# Patient Record
Sex: MALE | Race: WHITE | NOT HISPANIC OR LATINO | Employment: OTHER | URBAN - METROPOLITAN AREA
[De-identification: names, ages, dates, MRNs, and addresses within clinical notes are randomized per-mention and may not be internally consistent; named-entity substitution may affect disease eponyms.]

---

## 2017-03-26 ENCOUNTER — HOSPITAL ENCOUNTER (EMERGENCY)
Facility: HOSPITAL | Age: 27
Discharge: HOME | End: 2017-03-27
Attending: EMERGENCY MEDICINE | Admitting: EMERGENCY MEDICINE

## 2017-03-26 ENCOUNTER — APPOINTMENT (OUTPATIENT)
Dept: CT IMAGING | Facility: HOSPITAL | Age: 27
End: 2017-03-26

## 2017-03-26 DIAGNOSIS — R11.2 NAUSEA AND VOMITING, INTRACTABILITY OF VOMITING NOT SPECIFIED, UNSPECIFIED VOMITING TYPE: ICD-10-CM

## 2017-03-26 DIAGNOSIS — N20.1 URETERAL CALCULUS: ICD-10-CM

## 2017-03-26 DIAGNOSIS — N23 URETERAL COLIC: Primary | ICD-10-CM

## 2017-03-26 LAB
ALBUMIN SERPL-MCNC: 4.8 G/DL (ref 3.2–4.8)
ALBUMIN/GLOB SERPL: 1.5 G/DL (ref 1.5–2.5)
ALP SERPL-CCNC: 115 U/L (ref 25–100)
ALT SERPL W P-5'-P-CCNC: 46 U/L (ref 7–40)
ANION GAP SERPL CALCULATED.3IONS-SCNC: 9 MMOL/L (ref 3–11)
AST SERPL-CCNC: 34 U/L (ref 0–33)
BACTERIA UR QL AUTO: ABNORMAL /HPF
BASOPHILS # BLD AUTO: 0.03 10*3/MM3 (ref 0–0.2)
BASOPHILS NFR BLD AUTO: 0.3 % (ref 0–1)
BILIRUB SERPL-MCNC: 0.9 MG/DL (ref 0.3–1.2)
BILIRUB UR QL STRIP: NEGATIVE
BUN BLD-MCNC: 16 MG/DL (ref 9–23)
BUN/CREAT SERPL: 16 (ref 7–25)
CALCIUM SPEC-SCNC: 10 MG/DL (ref 8.7–10.4)
CHLORIDE SERPL-SCNC: 100 MMOL/L (ref 99–109)
CLARITY UR: ABNORMAL
CO2 SERPL-SCNC: 27 MMOL/L (ref 20–31)
COLOR UR: ABNORMAL
CREAT BLD-MCNC: 1 MG/DL (ref 0.6–1.3)
DEPRECATED RDW RBC AUTO: 37.8 FL (ref 37–54)
EOSINOPHIL # BLD AUTO: 0.26 10*3/MM3 (ref 0.1–0.3)
EOSINOPHIL NFR BLD AUTO: 2.2 % (ref 0–3)
ERYTHROCYTE [DISTWIDTH] IN BLOOD BY AUTOMATED COUNT: 12.6 % (ref 11.3–14.5)
GFR SERPL CREATININE-BSD FRML MDRD: 92 ML/MIN/1.73
GLOBULIN UR ELPH-MCNC: 3.1 GM/DL
GLUCOSE BLD-MCNC: 92 MG/DL (ref 70–100)
GLUCOSE UR STRIP-MCNC: NEGATIVE MG/DL
HCT VFR BLD AUTO: 41.5 % (ref 38.9–50.9)
HGB BLD-MCNC: 14.3 G/DL (ref 13.1–17.5)
HGB UR QL STRIP.AUTO: ABNORMAL
HYALINE CASTS UR QL AUTO: ABNORMAL /LPF
IMM GRANULOCYTES # BLD: 0.07 10*3/MM3 (ref 0–0.03)
IMM GRANULOCYTES NFR BLD: 0.6 % (ref 0–0.6)
KETONES UR QL STRIP: ABNORMAL
LEUKOCYTE ESTERASE UR QL STRIP.AUTO: ABNORMAL
LIPASE SERPL-CCNC: 27 U/L (ref 6–51)
LYMPHOCYTES # BLD AUTO: 2.92 10*3/MM3 (ref 0.6–4.8)
LYMPHOCYTES NFR BLD AUTO: 24.5 % (ref 24–44)
MCH RBC QN AUTO: 28.7 PG (ref 27–31)
MCHC RBC AUTO-ENTMCNC: 34.5 G/DL (ref 32–36)
MCV RBC AUTO: 83.3 FL (ref 80–99)
MONOCYTES # BLD AUTO: 0.63 10*3/MM3 (ref 0–1)
MONOCYTES NFR BLD AUTO: 5.3 % (ref 0–12)
NEUTROPHILS # BLD AUTO: 8 10*3/MM3 (ref 1.5–8.3)
NEUTROPHILS NFR BLD AUTO: 67.1 % (ref 41–71)
NITRITE UR QL STRIP: NEGATIVE
PH UR STRIP.AUTO: 6 [PH] (ref 5–8)
PLATELET # BLD AUTO: 207 10*3/MM3 (ref 150–450)
PMV BLD AUTO: 10.1 FL (ref 6–12)
POTASSIUM BLD-SCNC: 3.4 MMOL/L (ref 3.5–5.5)
PROT SERPL-MCNC: 7.9 G/DL (ref 5.7–8.2)
PROT UR QL STRIP: ABNORMAL
RBC # BLD AUTO: 4.98 10*6/MM3 (ref 4.2–5.76)
RBC # UR: ABNORMAL /HPF
REF LAB TEST METHOD: ABNORMAL
SODIUM BLD-SCNC: 136 MMOL/L (ref 132–146)
SP GR UR STRIP: 1.03 (ref 1–1.03)
SQUAMOUS #/AREA URNS HPF: ABNORMAL /HPF
UROBILINOGEN UR QL STRIP: ABNORMAL
WBC NRBC COR # BLD: 11.91 10*3/MM3 (ref 3.5–10.8)
WBC UR QL AUTO: ABNORMAL /HPF

## 2017-03-26 PROCEDURE — 25010000002 HYDROMORPHONE PER 4 MG: Performed by: EMERGENCY MEDICINE

## 2017-03-26 PROCEDURE — 74176 CT ABD & PELVIS W/O CONTRAST: CPT

## 2017-03-26 PROCEDURE — 96375 TX/PRO/DX INJ NEW DRUG ADDON: CPT

## 2017-03-26 PROCEDURE — 87086 URINE CULTURE/COLONY COUNT: CPT | Performed by: EMERGENCY MEDICINE

## 2017-03-26 PROCEDURE — 99284 EMERGENCY DEPT VISIT MOD MDM: CPT

## 2017-03-26 PROCEDURE — 80053 COMPREHEN METABOLIC PANEL: CPT | Performed by: EMERGENCY MEDICINE

## 2017-03-26 PROCEDURE — 81001 URINALYSIS AUTO W/SCOPE: CPT | Performed by: EMERGENCY MEDICINE

## 2017-03-26 PROCEDURE — 83690 ASSAY OF LIPASE: CPT | Performed by: EMERGENCY MEDICINE

## 2017-03-26 PROCEDURE — 96376 TX/PRO/DX INJ SAME DRUG ADON: CPT

## 2017-03-26 PROCEDURE — 96374 THER/PROPH/DIAG INJ IV PUSH: CPT

## 2017-03-26 PROCEDURE — 25010000002 ONDANSETRON PER 1 MG

## 2017-03-26 PROCEDURE — 25010000002 HYDROMORPHONE PER 4 MG

## 2017-03-26 PROCEDURE — 25010000002 KETOROLAC TROMETHAMINE PER 15 MG

## 2017-03-26 PROCEDURE — 85025 COMPLETE CBC W/AUTO DIFF WBC: CPT | Performed by: EMERGENCY MEDICINE

## 2017-03-26 RX ORDER — KETOROLAC TROMETHAMINE 15 MG/ML
15 INJECTION, SOLUTION INTRAMUSCULAR; INTRAVENOUS ONCE
Status: COMPLETED | OUTPATIENT
Start: 2017-03-26 | End: 2017-03-26

## 2017-03-26 RX ORDER — ONDANSETRON 2 MG/ML
INJECTION INTRAMUSCULAR; INTRAVENOUS
Status: COMPLETED
Start: 2017-03-26 | End: 2017-03-26

## 2017-03-26 RX ORDER — HYDROMORPHONE HYDROCHLORIDE 1 MG/ML
0.5 INJECTION, SOLUTION INTRAMUSCULAR; INTRAVENOUS; SUBCUTANEOUS ONCE
Status: COMPLETED | OUTPATIENT
Start: 2017-03-26 | End: 2017-03-26

## 2017-03-26 RX ORDER — IBUPROFEN 600 MG/1
600 TABLET ORAL EVERY 6 HOURS PRN
Status: ON HOLD | COMMUNITY
End: 2017-04-12

## 2017-03-26 RX ORDER — OXYCODONE HYDROCHLORIDE AND ACETAMINOPHEN 5; 325 MG/1; MG/1
1 TABLET ORAL EVERY 6 HOURS PRN
COMMUNITY
End: 2017-03-27

## 2017-03-26 RX ORDER — ONDANSETRON 2 MG/ML
4 INJECTION INTRAMUSCULAR; INTRAVENOUS ONCE
Status: COMPLETED | OUTPATIENT
Start: 2017-03-26 | End: 2017-03-26

## 2017-03-26 RX ORDER — HYDROMORPHONE HYDROCHLORIDE 1 MG/ML
0.5 INJECTION, SOLUTION INTRAMUSCULAR; INTRAVENOUS; SUBCUTANEOUS ONCE
Status: DISCONTINUED | OUTPATIENT
Start: 2017-03-26 | End: 2017-03-26

## 2017-03-26 RX ORDER — KETOROLAC TROMETHAMINE 15 MG/ML
INJECTION, SOLUTION INTRAMUSCULAR; INTRAVENOUS
Status: COMPLETED
Start: 2017-03-26 | End: 2017-03-26

## 2017-03-26 RX ORDER — TAMSULOSIN HYDROCHLORIDE 0.4 MG/1
1 CAPSULE ORAL DAILY
COMMUNITY
End: 2017-03-29 | Stop reason: HOSPADM

## 2017-03-26 RX ORDER — SODIUM CHLORIDE 0.9 % (FLUSH) 0.9 %
10 SYRINGE (ML) INJECTION AS NEEDED
Status: DISCONTINUED | OUTPATIENT
Start: 2017-03-26 | End: 2017-03-27 | Stop reason: HOSPADM

## 2017-03-26 RX ADMIN — Medication 1 MG: at 22:11

## 2017-03-26 RX ADMIN — ONDANSETRON 4 MG: 2 INJECTION INTRAMUSCULAR; INTRAVENOUS at 22:07

## 2017-03-26 RX ADMIN — HYDROMORPHONE HYDROCHLORIDE 0.5 MG: 1 INJECTION, SOLUTION INTRAMUSCULAR; INTRAVENOUS; SUBCUTANEOUS at 23:12

## 2017-03-26 RX ADMIN — HYDROMORPHONE HYDROCHLORIDE 1 MG: 1 INJECTION, SOLUTION INTRAMUSCULAR; INTRAVENOUS; SUBCUTANEOUS at 22:11

## 2017-03-26 RX ADMIN — KETOROLAC TROMETHAMINE 15 MG: 15 INJECTION, SOLUTION INTRAMUSCULAR; INTRAVENOUS at 22:11

## 2017-03-26 RX ADMIN — HYDROMORPHONE HYDROCHLORIDE 0.5 MG: 1 INJECTION, SOLUTION INTRAMUSCULAR; INTRAVENOUS; SUBCUTANEOUS at 22:29

## 2017-03-27 VITALS
TEMPERATURE: 98 F | DIASTOLIC BLOOD PRESSURE: 77 MMHG | OXYGEN SATURATION: 95 % | BODY MASS INDEX: 33.86 KG/M2 | SYSTOLIC BLOOD PRESSURE: 126 MMHG | HEIGHT: 72 IN | RESPIRATION RATE: 26 BRPM | HEART RATE: 78 BPM | WEIGHT: 250 LBS

## 2017-03-27 VITALS
OXYGEN SATURATION: 94 % | RESPIRATION RATE: 18 BRPM | BODY MASS INDEX: 33.18 KG/M2 | TEMPERATURE: 97.8 F | WEIGHT: 245 LBS | DIASTOLIC BLOOD PRESSURE: 80 MMHG | HEART RATE: 69 BPM | HEIGHT: 72 IN | SYSTOLIC BLOOD PRESSURE: 132 MMHG

## 2017-03-27 DIAGNOSIS — R11.2 INTRACTABLE VOMITING WITH NAUSEA, UNSPECIFIED VOMITING TYPE: ICD-10-CM

## 2017-03-27 DIAGNOSIS — N23 RENAL COLIC ON LEFT SIDE: Primary | ICD-10-CM

## 2017-03-27 DIAGNOSIS — E86.0 DEHYDRATION: ICD-10-CM

## 2017-03-27 DIAGNOSIS — R52 INTRACTABLE PAIN: ICD-10-CM

## 2017-03-27 LAB
ALBUMIN SERPL-MCNC: 4.6 G/DL (ref 3.2–4.8)
ALBUMIN/GLOB SERPL: 1.6 G/DL (ref 1.5–2.5)
ALP SERPL-CCNC: 106 U/L (ref 25–100)
ALT SERPL W P-5'-P-CCNC: 47 U/L (ref 7–40)
ANION GAP SERPL CALCULATED.3IONS-SCNC: 8 MMOL/L (ref 3–11)
AST SERPL-CCNC: 35 U/L (ref 0–33)
BACTERIA UR QL AUTO: ABNORMAL /HPF
BASOPHILS # BLD AUTO: 0.02 10*3/MM3 (ref 0–0.2)
BASOPHILS NFR BLD AUTO: 0.1 % (ref 0–1)
BILIRUB SERPL-MCNC: 1.2 MG/DL (ref 0.3–1.2)
BILIRUB UR QL STRIP: ABNORMAL
BUN BLD-MCNC: 17 MG/DL (ref 9–23)
BUN/CREAT SERPL: 15.5 (ref 7–25)
CALCIUM SPEC-SCNC: 9.5 MG/DL (ref 8.7–10.4)
CHLORIDE SERPL-SCNC: 102 MMOL/L (ref 99–109)
CLARITY UR: CLEAR
CO2 SERPL-SCNC: 27 MMOL/L (ref 20–31)
COLOR UR: ABNORMAL
CREAT BLD-MCNC: 1.1 MG/DL (ref 0.6–1.3)
DEPRECATED RDW RBC AUTO: 39.4 FL (ref 37–54)
EOSINOPHIL # BLD AUTO: 0.17 10*3/MM3 (ref 0.1–0.3)
EOSINOPHIL NFR BLD AUTO: 1.3 % (ref 0–3)
ERYTHROCYTE [DISTWIDTH] IN BLOOD BY AUTOMATED COUNT: 12.8 % (ref 11.3–14.5)
GFR SERPL CREATININE-BSD FRML MDRD: 82 ML/MIN/1.73
GLOBULIN UR ELPH-MCNC: 2.8 GM/DL
GLUCOSE BLD-MCNC: 86 MG/DL (ref 70–100)
GLUCOSE UR STRIP-MCNC: NEGATIVE MG/DL
HCT VFR BLD AUTO: 41.5 % (ref 38.9–50.9)
HGB BLD-MCNC: 14 G/DL (ref 13.1–17.5)
HGB UR QL STRIP.AUTO: ABNORMAL
HOLD SPECIMEN: NORMAL
HYALINE CASTS UR QL AUTO: ABNORMAL /LPF
IMM GRANULOCYTES # BLD: 0.06 10*3/MM3 (ref 0–0.03)
IMM GRANULOCYTES NFR BLD: 0.4 % (ref 0–0.6)
KETONES UR QL STRIP: ABNORMAL
LEUKOCYTE ESTERASE UR QL STRIP.AUTO: NEGATIVE
LYMPHOCYTES # BLD AUTO: 1.74 10*3/MM3 (ref 0.6–4.8)
LYMPHOCYTES NFR BLD AUTO: 12.8 % (ref 24–44)
MCH RBC QN AUTO: 28.7 PG (ref 27–31)
MCHC RBC AUTO-ENTMCNC: 33.7 G/DL (ref 32–36)
MCV RBC AUTO: 85 FL (ref 80–99)
MONOCYTES # BLD AUTO: 1.16 10*3/MM3 (ref 0–1)
MONOCYTES NFR BLD AUTO: 8.5 % (ref 0–12)
NEUTROPHILS # BLD AUTO: 10.44 10*3/MM3 (ref 1.5–8.3)
NEUTROPHILS NFR BLD AUTO: 76.9 % (ref 41–71)
NITRITE UR QL STRIP: NEGATIVE
PH UR STRIP.AUTO: 5.5 [PH] (ref 5–8)
PLATELET # BLD AUTO: 208 10*3/MM3 (ref 150–450)
PMV BLD AUTO: 10.5 FL (ref 6–12)
POTASSIUM BLD-SCNC: 4 MMOL/L (ref 3.5–5.5)
PROT SERPL-MCNC: 7.4 G/DL (ref 5.7–8.2)
PROT UR QL STRIP: ABNORMAL
RBC # BLD AUTO: 4.88 10*6/MM3 (ref 4.2–5.76)
RBC # UR: ABNORMAL /HPF
REF LAB TEST METHOD: ABNORMAL
SODIUM BLD-SCNC: 137 MMOL/L (ref 132–146)
SP GR UR STRIP: >=1.03 (ref 1–1.03)
SQUAMOUS #/AREA URNS HPF: ABNORMAL /HPF
UROBILINOGEN UR QL STRIP: ABNORMAL
WBC NRBC COR # BLD: 13.59 10*3/MM3 (ref 3.5–10.8)
WBC UR QL AUTO: ABNORMAL /HPF
WHOLE BLOOD HOLD SPECIMEN: NORMAL

## 2017-03-27 PROCEDURE — 96374 THER/PROPH/DIAG INJ IV PUSH: CPT

## 2017-03-27 PROCEDURE — 81001 URINALYSIS AUTO W/SCOPE: CPT | Performed by: EMERGENCY MEDICINE

## 2017-03-27 PROCEDURE — 85025 COMPLETE CBC W/AUTO DIFF WBC: CPT | Performed by: EMERGENCY MEDICINE

## 2017-03-27 PROCEDURE — 25010000002 ONDANSETRON PER 1 MG: Performed by: EMERGENCY MEDICINE

## 2017-03-27 PROCEDURE — 99283 EMERGENCY DEPT VISIT LOW MDM: CPT

## 2017-03-27 PROCEDURE — 25010000002 ONDANSETRON PER 1 MG: Performed by: PHYSICIAN ASSISTANT

## 2017-03-27 PROCEDURE — 96361 HYDRATE IV INFUSION ADD-ON: CPT

## 2017-03-27 PROCEDURE — 25010000002 KETOROLAC TROMETHAMINE PER 15 MG: Performed by: EMERGENCY MEDICINE

## 2017-03-27 PROCEDURE — 96376 TX/PRO/DX INJ SAME DRUG ADON: CPT

## 2017-03-27 PROCEDURE — 25010000002 KETOROLAC TROMETHAMINE PER 15 MG: Performed by: PHYSICIAN ASSISTANT

## 2017-03-27 PROCEDURE — 25010000002 HYDROMORPHONE PER 4 MG: Performed by: EMERGENCY MEDICINE

## 2017-03-27 PROCEDURE — 80053 COMPREHEN METABOLIC PANEL: CPT | Performed by: EMERGENCY MEDICINE

## 2017-03-27 PROCEDURE — 99284 EMERGENCY DEPT VISIT MOD MDM: CPT

## 2017-03-27 PROCEDURE — 36415 COLL VENOUS BLD VENIPUNCTURE: CPT

## 2017-03-27 PROCEDURE — 96375 TX/PRO/DX INJ NEW DRUG ADDON: CPT

## 2017-03-27 RX ORDER — OXYCODONE HYDROCHLORIDE AND ACETAMINOPHEN 5; 325 MG/1; MG/1
1 TABLET ORAL EVERY 6 HOURS PRN
Qty: 10 TABLET | Refills: 0 | Status: ON HOLD | OUTPATIENT
Start: 2017-03-27 | End: 2017-04-12

## 2017-03-27 RX ORDER — SODIUM CHLORIDE 9 MG/ML
250 INJECTION, SOLUTION INTRAVENOUS CONTINUOUS
Status: DISCONTINUED | OUTPATIENT
Start: 2017-03-27 | End: 2017-03-27

## 2017-03-27 RX ORDER — ONDANSETRON 4 MG/1
4 TABLET, FILM COATED ORAL EVERY 8 HOURS PRN
Qty: 20 TABLET | Refills: 0 | Status: SHIPPED | OUTPATIENT
Start: 2017-03-27 | End: 2017-03-29 | Stop reason: HOSPADM

## 2017-03-27 RX ORDER — SODIUM CHLORIDE 0.9 % (FLUSH) 0.9 %
10 SYRINGE (ML) INJECTION AS NEEDED
Status: DISCONTINUED | OUTPATIENT
Start: 2017-03-27 | End: 2017-03-27 | Stop reason: HOSPADM

## 2017-03-27 RX ORDER — KETOROLAC TROMETHAMINE 30 MG/ML
30 INJECTION, SOLUTION INTRAMUSCULAR; INTRAVENOUS EVERY 6 HOURS PRN
Status: DISCONTINUED | OUTPATIENT
Start: 2017-03-27 | End: 2017-03-27 | Stop reason: HOSPADM

## 2017-03-27 RX ORDER — HYDROMORPHONE HYDROCHLORIDE 2 MG/1
TABLET ORAL
Qty: 24 TABLET | Refills: 0 | Status: SHIPPED | OUTPATIENT
Start: 2017-03-27 | End: 2017-03-29 | Stop reason: HOSPADM

## 2017-03-27 RX ORDER — ONDANSETRON 2 MG/ML
4 INJECTION INTRAMUSCULAR; INTRAVENOUS ONCE
Status: COMPLETED | OUTPATIENT
Start: 2017-03-27 | End: 2017-03-27

## 2017-03-27 RX ORDER — KETOROLAC TROMETHAMINE 15 MG/ML
15 INJECTION, SOLUTION INTRAMUSCULAR; INTRAVENOUS ONCE
Status: COMPLETED | OUTPATIENT
Start: 2017-03-27 | End: 2017-03-27

## 2017-03-27 RX ORDER — HYDROMORPHONE HYDROCHLORIDE 1 MG/ML
0.5 INJECTION, SOLUTION INTRAMUSCULAR; INTRAVENOUS; SUBCUTANEOUS ONCE
Status: COMPLETED | OUTPATIENT
Start: 2017-03-27 | End: 2017-03-27

## 2017-03-27 RX ORDER — ONDANSETRON HYDROCHLORIDE 8 MG/1
8 TABLET, FILM COATED ORAL EVERY 8 HOURS PRN
Qty: 15 TABLET | Refills: 0 | Status: SHIPPED | OUTPATIENT
Start: 2017-03-27 | End: 2017-03-29 | Stop reason: HOSPADM

## 2017-03-27 RX ADMIN — KETOROLAC TROMETHAMINE 15 MG: 15 INJECTION, SOLUTION INTRAMUSCULAR; INTRAVENOUS at 03:26

## 2017-03-27 RX ADMIN — SODIUM CHLORIDE 1000 ML: 9 INJECTION, SOLUTION INTRAVENOUS at 18:45

## 2017-03-27 RX ADMIN — ONDANSETRON 4 MG: 2 INJECTION INTRAMUSCULAR; INTRAVENOUS at 03:24

## 2017-03-27 RX ADMIN — KETOROLAC TROMETHAMINE 30 MG: 30 INJECTION, SOLUTION INTRAMUSCULAR at 18:40

## 2017-03-27 RX ADMIN — Medication 10 ML: at 17:15

## 2017-03-27 RX ADMIN — HYDROMORPHONE HYDROCHLORIDE 0.5 MG: 1 INJECTION, SOLUTION INTRAMUSCULAR; INTRAVENOUS; SUBCUTANEOUS at 20:38

## 2017-03-27 RX ADMIN — ONDANSETRON 4 MG: 2 INJECTION INTRAMUSCULAR; INTRAVENOUS at 18:38

## 2017-03-27 RX ADMIN — HYDROMORPHONE HYDROCHLORIDE 1 MG: 1 INJECTION, SOLUTION INTRAMUSCULAR; INTRAVENOUS; SUBCUTANEOUS at 18:42

## 2017-03-27 NOTE — DISCHARGE INSTRUCTIONS
Increase fluid intake.  Rest.  Follow-up with Dr. Hines for urology consult.  If any fevers chills or sweats, return to emergency department immediately for evaluation.

## 2017-03-27 NOTE — ED PROVIDER NOTES
Subjective   HPI Comments: Mr. Romero is a 26 y.o. male presents to the ED w/ c/o flank pain starting 4 days ago. He locates left sided flank pain that radiates around to his abdomen. He states oral narcotics, toradol, and ibuprofen are not helping his pain. He also complains of 10-15 episodes of N/V along with his pain. He was seen here yesterday and found a 3mm kidney stone. He has no other acute sx at this time.        Patient is a 26 y.o. male presenting with flank pain.   History provided by:  Patient  Flank Pain   Pain location:  L flank  Pain radiates to:  LLQ  Pain severity:  Severe  Onset quality:  Sudden  Duration:  4 days  Timing:  Constant  Progression:  Unchanged  Chronicity:  New  Relieved by:  Nothing  Ineffective treatments:  Acetaminophen and NSAIDs (Narcotics)  Associated symptoms: nausea and vomiting        Review of Systems   Constitutional: Negative.    HENT: Negative.    Cardiovascular: Negative.    Gastrointestinal: Positive for nausea and vomiting.   Genitourinary: Positive for flank pain.   Neurological: Negative.    All other systems reviewed and are negative.      Past Medical History:   Diagnosis Date   • Kidney stone    • Renal disorder        No Known Allergies    History reviewed. No pertinent surgical history.    History reviewed. No pertinent family history.    Social History     Social History   • Marital status: Single     Spouse name: N/A   • Number of children: N/A   • Years of education: N/A     Social History Main Topics   • Smoking status: Never Smoker   • Smokeless tobacco: None   • Alcohol use 1.8 oz/week     3 Cans of beer per week      Comment: pt drinks once a week   • Drug use: No   • Sexual activity: Defer     Other Topics Concern   • None     Social History Narrative   • None         Objective   Physical Exam   Constitutional: He is oriented to person, place, and time. He appears well-developed and well-nourished.   Pt in significant pain rocking and writhing on  exam.   HENT:   Head: Normocephalic and atraumatic.   Eyes: Conjunctivae are normal.   Neck: Normal range of motion. Neck supple.   Cardiovascular: Normal rate, regular rhythm, normal heart sounds and intact distal pulses.    Pulmonary/Chest: Effort normal and breath sounds normal. No respiratory distress.   Abdominal: Soft. There is no tenderness.   Musculoskeletal: Normal range of motion.   Neurological: He is alert and oriented to person, place, and time. He has normal strength.   Skin: Skin is warm and dry.   Psychiatric: He has a normal mood and affect. His behavior is normal.   Nursing note and vitals reviewed.      Procedures         ED Course  ED Course   Comment By Time   Symptoms have resolved after appropriate analgesia.  Laboratory evaluation is pending.  The patient is hydrated here in the ED.  I discussed the planned course of care.  Patient and father are in agreement. Jono Brothers MD 03/27 1914   His findings at length with patient and father immediately prior to discharge.  I discussed case with Dr. Agrawal will see the patient in the office on Wednesday, and added made that arrangement.I've discussed indications for immediate return including fever with pain intractable pain or intractable nausea.  I discussed excellent hydration.  The patient is taking oral fluids well in the ED in addition to his IV hydration.Very pleasant and reliable patient and father verbalized understanding agreement with the plan of care. Jono Brothers MD 03/27 2149     Recent Results (from the past 24 hour(s))   Urinalysis With / Culture If Indicated    Collection Time: 03/26/17 10:42 PM   Result Value Ref Range    Color, UA Dark Yellow (A) Yellow, Straw    Appearance, UA Cloudy (A) Clear    pH, UA 6.0 5.0 - 8.0    Specific Gravity, UA 1.028 1.001 - 1.030    Glucose, UA Negative Negative    Ketones, UA Trace (A) Negative    Bilirubin, UA Negative Negative    Blood, UA Large (3+) (A) Negative    Protein, UA Trace (A)  Negative    Leuk Esterase, UA Trace (A) Negative    Nitrite, UA Negative Negative    Urobilinogen, UA 1.0 E.U./dL 0.2 - 1.0 E.U./dL   Urinalysis, Microscopic Only    Collection Time: 03/26/17 10:42 PM   Result Value Ref Range    RBC, UA Too Numerous to Count (A) None Seen, 0-2 /HPF    WBC, UA 6-12 (A) None Seen /HPF    Bacteria, UA None Seen None Seen, Trace /HPF    Squamous Epithelial Cells, UA 0-2 None Seen, 0-2 /HPF    Hyaline Casts, UA 0-6 0 - 6 /LPF    Methodology Automated Microscopy    Comprehensive Metabolic Panel    Collection Time: 03/27/17  5:19 PM   Result Value Ref Range    Glucose 86 70 - 100 mg/dL    BUN 17 9 - 23 mg/dL    Creatinine 1.10 0.60 - 1.30 mg/dL    Sodium 137 132 - 146 mmol/L    Potassium 4.0 3.5 - 5.5 mmol/L    Chloride 102 99 - 109 mmol/L    CO2 27.0 20.0 - 31.0 mmol/L    Calcium 9.5 8.7 - 10.4 mg/dL    Total Protein 7.4 5.7 - 8.2 g/dL    Albumin 4.60 3.20 - 4.80 g/dL    ALT (SGPT) 47 (H) 7 - 40 U/L    AST (SGOT) 35 (H) 0 - 33 U/L    Alkaline Phosphatase 106 (H) 25 - 100 U/L    Total Bilirubin 1.2 0.3 - 1.2 mg/dL    eGFR Non African Amer 82 >60 mL/min/1.73    Globulin 2.8 gm/dL    A/G Ratio 1.6 1.5 - 2.5 g/dL    BUN/Creatinine Ratio 15.5 7.0 - 25.0    Anion Gap 8.0 3.0 - 11.0 mmol/L   CBC Auto Differential    Collection Time: 03/27/17  5:19 PM   Result Value Ref Range    WBC 13.59 (H) 3.50 - 10.80 10*3/mm3    RBC 4.88 4.20 - 5.76 10*6/mm3    Hemoglobin 14.0 13.1 - 17.5 g/dL    Hematocrit 41.5 38.9 - 50.9 %    MCV 85.0 80.0 - 99.0 fL    MCH 28.7 27.0 - 31.0 pg    MCHC 33.7 32.0 - 36.0 g/dL    RDW 12.8 11.3 - 14.5 %    RDW-SD 39.4 37.0 - 54.0 fl    MPV 10.5 6.0 - 12.0 fL    Platelets 208 150 - 450 10*3/mm3    Neutrophil % 76.9 (H) 41.0 - 71.0 %    Lymphocyte % 12.8 (L) 24.0 - 44.0 %    Monocyte % 8.5 0.0 - 12.0 %    Eosinophil % 1.3 0.0 - 3.0 %    Basophil % 0.1 0.0 - 1.0 %    Immature Grans % 0.4 0.0 - 0.6 %    Neutrophils, Absolute 10.44 (H) 1.50 - 8.30 10*3/mm3    Lymphocytes, Absolute  "1.74 0.60 - 4.80 10*3/mm3    Monocytes, Absolute 1.16 (H) 0.00 - 1.00 10*3/mm3    Eosinophils, Absolute 0.17 0.10 - 0.30 10*3/mm3    Basophils, Absolute 0.02 0.00 - 0.20 10*3/mm3    Immature Grans, Absolute 0.06 (H) 0.00 - 0.03 10*3/mm3   Urinalysis With / Culture If Indicated    Collection Time: 03/27/17  8:27 PM   Result Value Ref Range    Color, UA Dark Yellow (A) Yellow, Straw    Appearance, UA Clear Clear    pH, UA 5.5 5.0 - 8.0    Specific Gravity, UA >=1.030 1.001 - 1.030    Glucose, UA Negative Negative    Ketones, UA 80 mg/dL (3+) (A) Negative    Bilirubin, UA Small (1+) (A) Negative    Blood, UA Moderate (2+) (A) Negative    Protein, UA Trace (A) Negative    Leuk Esterase, UA Negative Negative    Nitrite, UA Negative Negative    Urobilinogen, UA 1.0 E.U./dL 0.2 - 1.0 E.U./dL   Urinalysis, Microscopic Only    Collection Time: 03/27/17  8:27 PM   Result Value Ref Range    RBC, UA 13-20 (A) None Seen, 0-2 /HPF    WBC, UA 0-2 (A) None Seen /HPF    Bacteria, UA None Seen None Seen, Trace /HPF    Squamous Epithelial Cells, UA None Seen None Seen, 0-2 /HPF    Hyaline Casts, UA None Seen 0 - 6 /LPF    Methodology Automated Microscopy      Note: In addition to lab results from this visit, the labs listed above may include labs taken at another facility or during a different encounter within the last 24 hours. Please correlate lab times with ED admission and discharge times for further clarification of the services performed during this visit.    No orders to display     Vitals:    03/27/17 1707 03/27/17 2100   BP: 168/95 132/80   BP Location: Left arm    Patient Position: Sitting    Pulse: 69    Resp: 18    Temp: 97.8 °F (36.6 °C)    TempSrc: Oral    SpO2: 96% 94%   Weight: 245 lb (111 kg)    Height: 72\" (182.9 cm)      Medications   sodium chloride 0.9 % flush 10 mL (10 mL Intravenous Given by Other 3/27/17 1715)   ketorolac (TORADOL) injection 30 mg (30 mg Intravenous Given 3/27/17 1840)   sodium chloride 0.9 % " bolus 1,000 mL (0 mL Intravenous Stopped 3/27/17 2029)   ondansetron (ZOFRAN) injection 4 mg (4 mg Intravenous Given 3/27/17 1838)   HYDROmorphone (DILAUDID) injection 1 mg (1 mg Intravenous Given 3/27/17 1842)   HYDROmorphone (DILAUDID) injection 0.5 mg (0.5 mg Intravenous Given 3/27/17 2038)     ECG/EMG Results (last 24 hours)     ** No results found for the last 24 hours. **                          [facility]    Final diagnoses:   Renal colic on left side   Dehydration   Intractable pain   Intractable vomiting with nausea, unspecified vomiting type       Documentation assistance provided by josué BROWER.  Information recorded by the scribe was done at my direction and has been verified and validated by me.     Nanci Brower  03/27/17 1840       Nanci Brower  03/27/17 2058       Jono Brothers MD  03/27/17 5975

## 2017-03-27 NOTE — ED PROVIDER NOTES
Subjective   HPI Comments: Faizan Romero is a 26 y.o.male who presents to the ED with c/o flank pain onset 3 days ago. He reports experiencing constant and severe left sided flank pain radiating into his left abdominal region. He was seen at  3 days ago and had a CT performed showing a small kidney stone.He was prescribed percocet, ibuprofen 600 mg, and Toradol at that time. At 2000 this evening, his pain worsened. He attempted taking his medications without any relief. He also c/o constant nausea, frequent vomiting episodes, and hematuria but denies any other complaints at this time.    Patient is a 26 y.o. male presenting with flank pain.   History provided by:  Patient  Flank Pain   Pain location:  L flank  Pain quality: sharp    Pain radiates to:  LLQ and LUQ  Pain severity:  Severe  Onset quality:  Sudden  Timing:  Constant  Progression:  Worsening  Chronicity:  New  Context: recent illness (Diagnosed with a kidney stone 3 days ago)    Relieved by:  Nothing  Worsened by:  Nothing  Ineffective treatments: Percocet, Ibuprofen, Toradol.  Associated symptoms: hematuria, nausea and vomiting    Associated symptoms: no chest pain, no chills, no cough, no diarrhea, no dysuria, no fever, no shortness of breath and no sore throat    Risk factors: obesity        Review of Systems   Constitutional: Negative for chills and fever.   HENT: Negative for congestion, rhinorrhea and sore throat.    Respiratory: Negative for cough and shortness of breath.    Cardiovascular: Negative for chest pain.   Gastrointestinal: Positive for abdominal pain, nausea and vomiting. Negative for diarrhea.   Genitourinary: Positive for flank pain and hematuria. Negative for dysuria.   Musculoskeletal: Negative for back pain and neck pain.   Neurological: Negative for dizziness, weakness, light-headedness and headaches.   All other systems reviewed and are negative.      Past Medical History:   Diagnosis Date   • Renal disorder        No  Known Allergies    History reviewed. No pertinent surgical history.    History reviewed. No pertinent family history.    Social History     Social History   • Marital status: Single     Spouse name: N/A   • Number of children: N/A   • Years of education: N/A     Social History Main Topics   • Smoking status: Never Smoker   • Smokeless tobacco: None   • Alcohol use 1.8 oz/week     3 Cans of beer per week      Comment: pt drinks once a week   • Drug use: No   • Sexual activity: Defer     Other Topics Concern   • None     Social History Narrative   • None         Objective   Physical Exam   Constitutional: He is oriented to person, place, and time. He appears well-developed and well-nourished. He appears distressed (Moderate to significant ).   Rocking back and forth in pain. Appears severely restless.   HENT:   Head: Normocephalic and atraumatic.   Nose: Nose normal.   Mouth/Throat: Oropharynx is clear and moist.   Eyes: Conjunctivae are normal. Pupils are equal, round, and reactive to light.   Neck: Normal range of motion. Neck supple.   Cardiovascular: Normal rate, regular rhythm, normal heart sounds and intact distal pulses.    No murmur heard.  Pulmonary/Chest: Effort normal and breath sounds normal. No respiratory distress.   Abdominal: Soft. Bowel sounds are normal. He exhibits no mass. There is no tenderness (No abdominal tenderness). There is CVA tenderness (mild left CVA/flank tenderness). There is no rebound and no guarding.   Musculoskeletal: Normal range of motion. He exhibits no edema.   Neurological: He is alert and oriented to person, place, and time.   Skin: Skin is warm. He is diaphoretic.   Psychiatric: He has a normal mood and affect. His behavior is normal.   Nursing note and vitals reviewed.      Procedures         ED Course  ED Course   Comment By Time   IMPRESSION:  1. Mild left hydroureteronephrosis and perinephric/periureteric stranding   with a 3 mm calculus in the left mid ureter. No  additional urinary tract   calculi seen.  2. Mild splenomegaly. Kj Mccray PA-C 03/27 0248     Recent Results (from the past 24 hour(s))   Comprehensive Metabolic Panel    Collection Time: 03/26/17  9:49 PM   Result Value Ref Range    Glucose 92 70 - 100 mg/dL    BUN 16 9 - 23 mg/dL    Creatinine 1.00 0.60 - 1.30 mg/dL    Sodium 136 132 - 146 mmol/L    Potassium 3.4 (L) 3.5 - 5.5 mmol/L    Chloride 100 99 - 109 mmol/L    CO2 27.0 20.0 - 31.0 mmol/L    Calcium 10.0 8.7 - 10.4 mg/dL    Total Protein 7.9 5.7 - 8.2 g/dL    Albumin 4.80 3.20 - 4.80 g/dL    ALT (SGPT) 46 (H) 7 - 40 U/L    AST (SGOT) 34 (H) 0 - 33 U/L    Alkaline Phosphatase 115 (H) 25 - 100 U/L    Total Bilirubin 0.9 0.3 - 1.2 mg/dL    eGFR Non African Amer 92 >60 mL/min/1.73    Globulin 3.1 gm/dL    A/G Ratio 1.5 1.5 - 2.5 g/dL    BUN/Creatinine Ratio 16.0 7.0 - 25.0    Anion Gap 9.0 3.0 - 11.0 mmol/L   Lipase    Collection Time: 03/26/17  9:49 PM   Result Value Ref Range    Lipase 27 6 - 51 U/L   Light Blue Top    Collection Time: 03/26/17  9:49 PM   Result Value Ref Range    Extra Tube hold for add-on    Green Top (Gel)    Collection Time: 03/26/17  9:49 PM   Result Value Ref Range    Extra Tube Hold for add-ons.    Lavender Top    Collection Time: 03/26/17  9:49 PM   Result Value Ref Range    Extra Tube hold for add-on    Gold Top - SST    Collection Time: 03/26/17  9:49 PM   Result Value Ref Range    Extra Tube Hold for add-ons.    CBC Auto Differential    Collection Time: 03/26/17  9:49 PM   Result Value Ref Range    WBC 11.91 (H) 3.50 - 10.80 10*3/mm3    RBC 4.98 4.20 - 5.76 10*6/mm3    Hemoglobin 14.3 13.1 - 17.5 g/dL    Hematocrit 41.5 38.9 - 50.9 %    MCV 83.3 80.0 - 99.0 fL    MCH 28.7 27.0 - 31.0 pg    MCHC 34.5 32.0 - 36.0 g/dL    RDW 12.6 11.3 - 14.5 %    RDW-SD 37.8 37.0 - 54.0 fl    MPV 10.1 6.0 - 12.0 fL    Platelets 207 150 - 450 10*3/mm3    Neutrophil % 67.1 41.0 - 71.0 %    Lymphocyte % 24.5 24.0 - 44.0 %    Monocyte % 5.3 0.0  - 12.0 %    Eosinophil % 2.2 0.0 - 3.0 %    Basophil % 0.3 0.0 - 1.0 %    Immature Grans % 0.6 0.0 - 0.6 %    Neutrophils, Absolute 8.00 1.50 - 8.30 10*3/mm3    Lymphocytes, Absolute 2.92 0.60 - 4.80 10*3/mm3    Monocytes, Absolute 0.63 0.00 - 1.00 10*3/mm3    Eosinophils, Absolute 0.26 0.10 - 0.30 10*3/mm3    Basophils, Absolute 0.03 0.00 - 0.20 10*3/mm3    Immature Grans, Absolute 0.07 (H) 0.00 - 0.03 10*3/mm3   Urinalysis With / Culture If Indicated    Collection Time: 03/26/17 10:42 PM   Result Value Ref Range    Color, UA Dark Yellow (A) Yellow, Straw    Appearance, UA Cloudy (A) Clear    pH, UA 6.0 5.0 - 8.0    Specific Gravity, UA 1.028 1.001 - 1.030    Glucose, UA Negative Negative    Ketones, UA Trace (A) Negative    Bilirubin, UA Negative Negative    Blood, UA Large (3+) (A) Negative    Protein, UA Trace (A) Negative    Leuk Esterase, UA Trace (A) Negative    Nitrite, UA Negative Negative    Urobilinogen, UA 1.0 E.U./dL 0.2 - 1.0 E.U./dL   Urinalysis, Microscopic Only    Collection Time: 03/26/17 10:42 PM   Result Value Ref Range    RBC, UA Too Numerous to Count (A) None Seen, 0-2 /HPF    WBC, UA 6-12 (A) None Seen /HPF    Bacteria, UA None Seen None Seen, Trace /HPF    Squamous Epithelial Cells, UA 0-2 None Seen, 0-2 /HPF    Hyaline Casts, UA 0-6 0 - 6 /LPF    Methodology Automated Microscopy      Note: In addition to lab results from this visit, the labs listed above may include labs taken at another facility or during a different encounter within the last 24 hours. Please correlate lab times with ED admission and discharge times for further clarification of the services performed during this visit.    CT Abdomen Pelvis Without Contrast   ED Interpretation     1.  Mild left hydroureteronephrosis and perinephric/periureteric stranding    with a 3 mm calculus in the left mid ureter.  No additional urinary tract    calculi seen.     2.  Mild splenomegaly.         THIS DOCUMENT HAS BEEN ELECTRONICALLY SIGNED  BY GATITO CASTANO MD      Final Result   Abnormal     1.  Mild left hydroureteronephrosis and perinephric/periureteric stranding    with a 3 mm calculus in the left mid ureter.  No additional urinary tract    calculi seen.     2.  Mild splenomegaly.         THIS DOCUMENT HAS BEEN ELECTRONICALLY SIGNED BY GATITO CASTANO MD        Vitals:    03/27/17 0100 03/27/17 0130 03/27/17 0230 03/27/17 0300   BP: 120/76 127/67 136/81 126/77   BP Location:       Patient Position:       Pulse:       Resp:       Temp:       TempSrc:       SpO2: 96% (!) 88% 96% 95%   Weight:       Height:         Medications   sodium chloride 0.9 % flush 10 mL (not administered)   HYDROmorphone (DILAUDID) injection 1 mg (1 mg Intravenous Given 3/26/17 2211)   ondansetron (ZOFRAN) injection 4 mg (4 mg Intravenous Given 3/26/17 2207)   ketorolac (TORADOL) injection 15 mg (15 mg Intravenous Given 3/26/17 2211)   HYDROmorphone (DILAUDID) injection 0.5 mg (0.5 mg Intravenous Given 3/26/17 2229)   HYDROmorphone (DILAUDID) injection 0.5 mg (0.5 mg Intravenous Given 3/26/17 2312)   ondansetron (ZOFRAN) injection 4 mg (4 mg Intravenous Given 3/27/17 0324)   ketorolac (TORADOL) injection 15 mg (15 mg Intravenous Given 3/27/17 0326)     ECG/EMG Results (last 24 hours)     ** No results found for the last 24 hours. **                        [facility]    Final diagnoses:   Ureteral colic   Ureteral calculus   Nausea and vomiting, intractability of vomiting not specified, unspecified vomiting type       Documentation assistance provided by josué Cardoza.  Information recorded by the scribe was done at my direction and has been verified and validated by me.     Bev Cardoza  03/26/17 2218       Kj Mccray PA-C  03/27/17 [id number]

## 2017-03-28 PROCEDURE — 96375 TX/PRO/DX INJ NEW DRUG ADDON: CPT

## 2017-03-28 PROCEDURE — 99284 EMERGENCY DEPT VISIT MOD MDM: CPT

## 2017-03-28 PROCEDURE — 96374 THER/PROPH/DIAG INJ IV PUSH: CPT

## 2017-03-28 NOTE — DISCHARGE INSTRUCTIONS
Follow up with Dr. Agrawal in his office on Wednesday (3/29/17) at 10 am.    Take either the Dilaudid or the Percocet, not both.  Push fluids and stay very well-hydrated.  Take Zofran for nausea at the onset of pain and nausea.    Immediately return to the emergency department for any new or worsening symptoms especially intractable pain, intractable nausea, or fever with pain..     Information regarding Risks and Benefits When using Opioids and Other Controlled Substances to include Storage and Disposal of Medications    When considering the use of opioids and other controlled substances for the control of pain, anxiety, or for other medical purposes, you need to know of not only the benefits of these drugs but also of potential risks in using these drugs. These drugs, as well as more drugs, have beneficial uses; which is why their use is being considered in your   care, but they have risks involved in their use, too.    Opioids:  Opioids such as hydrocodone, oxycodone, hydromorphone, and codeine are pain relieving drugs, some more potent than others. They are most useful for moderate to more severe painful conditions. Risks include sedation, loss of coordination, decreased concentration, and decreased breathing with possibility of loss of consciousness or even death, especially if used in doses higher than prescribed. Improper usage can lead to addiction, tolerance, or overdose. In addition, many of these drugs are combined with acetaminophen (Tylenol) which can damage or destroy our liver when used excessively.  Alternatives to opioids are useful for mild to moderate pain and include ibuprofen (Motrin), naproxen (Aleve), aspirin, and acetaminophen (Tylenol). As with other drugs, these medications should be used according to directions on the label or from your doctor, as overuse can cause harm.    Benzodiazepines:  This group of drugs include: alprazolam (Xanax), diazepam (Valium), lorazepam (Ativan), and  clonazepam (Klonopin). These drugs are used to control anxiety symptoms including anxiety and panic attacks. Risks using these drugs include: sedation, loss of coordination, decreased ability to concentrate, effects on memory, and decreased breathing with possibility of loss of consciousness or even death. Improper and prolonged usage can lead to addiction. An alternative without addiction potential is hydroxyzine (Vistrail).    Other Controlled Substance:  This group includes Soma, Tramadol, stimulant drugs such as Ritalin, and others. Stimulant drugs are not medications that are prescribed by ER doctors. Soma and Tramadol have sedative and addictive affects similar to opioids with the same dangers mentioned with them.    Overdose:  If you or someone else are concerned that overdose has occurred, call 911 for transportation to the nearest hospital.    Storage and Disposal:  All medications need to be kept out of the reach of children or adults who cannot manage their own medicines. In addition, controlled substances can be targeted by criminals so extra precautions need to be taken to keep them in a safe, secure place. Any unused medications should be disposed of by flushing them down the toilet in the home setting or contact your local pharmacy.

## 2017-03-29 ENCOUNTER — HOSPITAL ENCOUNTER (OUTPATIENT)
Facility: HOSPITAL | Age: 27
Setting detail: OBSERVATION
Discharge: HOME | End: 2017-03-29
Attending: EMERGENCY MEDICINE | Admitting: UROLOGY

## 2017-03-29 ENCOUNTER — ANESTHESIA (OUTPATIENT)
Dept: PERIOP | Facility: HOSPITAL | Age: 27
End: 2017-03-29

## 2017-03-29 ENCOUNTER — APPOINTMENT (OUTPATIENT)
Dept: GENERAL RADIOLOGY | Facility: HOSPITAL | Age: 27
End: 2017-03-29

## 2017-03-29 ENCOUNTER — ANESTHESIA EVENT (OUTPATIENT)
Dept: PERIOP | Facility: HOSPITAL | Age: 27
End: 2017-03-29

## 2017-03-29 VITALS
HEART RATE: 101 BPM | BODY MASS INDEX: 33.18 KG/M2 | WEIGHT: 245 LBS | OXYGEN SATURATION: 97 % | TEMPERATURE: 97.8 F | RESPIRATION RATE: 16 BRPM | SYSTOLIC BLOOD PRESSURE: 127 MMHG | DIASTOLIC BLOOD PRESSURE: 86 MMHG | HEIGHT: 72 IN

## 2017-03-29 DIAGNOSIS — R52 INTRACTABLE PAIN: ICD-10-CM

## 2017-03-29 DIAGNOSIS — N20.0 NEPHROLITHIASIS: ICD-10-CM

## 2017-03-29 DIAGNOSIS — N23 RENAL COLIC: Primary | ICD-10-CM

## 2017-03-29 LAB
ALBUMIN SERPL-MCNC: 4.6 G/DL (ref 3.2–4.8)
ALBUMIN/GLOB SERPL: 1.4 G/DL (ref 1.5–2.5)
ALP SERPL-CCNC: 98 U/L (ref 25–100)
ALT SERPL W P-5'-P-CCNC: 38 U/L (ref 7–40)
ANION GAP SERPL CALCULATED.3IONS-SCNC: 6 MMOL/L (ref 3–11)
AST SERPL-CCNC: 24 U/L (ref 0–33)
BACTERIA SPEC AEROBE CULT: NORMAL
BACTERIA UR QL AUTO: ABNORMAL /HPF
BASOPHILS # BLD AUTO: 0.02 10*3/MM3 (ref 0–0.2)
BASOPHILS NFR BLD AUTO: 0.2 % (ref 0–1)
BILIRUB SERPL-MCNC: 1.4 MG/DL (ref 0.3–1.2)
BILIRUB UR QL STRIP: NEGATIVE
BUN BLD-MCNC: 18 MG/DL (ref 9–23)
BUN/CREAT SERPL: 13.8 (ref 7–25)
CALCIUM SPEC-SCNC: 10 MG/DL (ref 8.7–10.4)
CHLORIDE SERPL-SCNC: 101 MMOL/L (ref 99–109)
CLARITY UR: CLEAR
CO2 SERPL-SCNC: 27 MMOL/L (ref 20–31)
COLOR UR: ABNORMAL
CREAT BLD-MCNC: 1.3 MG/DL (ref 0.6–1.3)
DEPRECATED RDW RBC AUTO: 38.1 FL (ref 37–54)
EOSINOPHIL # BLD AUTO: 0.25 10*3/MM3 (ref 0.1–0.3)
EOSINOPHIL NFR BLD AUTO: 2.2 % (ref 0–3)
ERYTHROCYTE [DISTWIDTH] IN BLOOD BY AUTOMATED COUNT: 12.5 % (ref 11.3–14.5)
GFR SERPL CREATININE-BSD FRML MDRD: 68 ML/MIN/1.73
GLOBULIN UR ELPH-MCNC: 3.4 GM/DL
GLUCOSE BLD-MCNC: 82 MG/DL (ref 70–100)
GLUCOSE UR STRIP-MCNC: NEGATIVE MG/DL
HCT VFR BLD AUTO: 40.6 % (ref 38.9–50.9)
HGB BLD-MCNC: 13.7 G/DL (ref 13.1–17.5)
HGB UR QL STRIP.AUTO: ABNORMAL
HYALINE CASTS UR QL AUTO: ABNORMAL /LPF
IMM GRANULOCYTES # BLD: 0.07 10*3/MM3 (ref 0–0.03)
IMM GRANULOCYTES NFR BLD: 0.6 % (ref 0–0.6)
KETONES UR QL STRIP: ABNORMAL
LEUKOCYTE ESTERASE UR QL STRIP.AUTO: NEGATIVE
LYMPHOCYTES # BLD AUTO: 2.17 10*3/MM3 (ref 0.6–4.8)
LYMPHOCYTES NFR BLD AUTO: 18.8 % (ref 24–44)
MCH RBC QN AUTO: 28.4 PG (ref 27–31)
MCHC RBC AUTO-ENTMCNC: 33.7 G/DL (ref 32–36)
MCV RBC AUTO: 84.1 FL (ref 80–99)
MONOCYTES # BLD AUTO: 1.18 10*3/MM3 (ref 0–1)
MONOCYTES NFR BLD AUTO: 10.2 % (ref 0–12)
NEUTROPHILS # BLD AUTO: 7.87 10*3/MM3 (ref 1.5–8.3)
NEUTROPHILS NFR BLD AUTO: 68 % (ref 41–71)
NITRITE UR QL STRIP: NEGATIVE
PH UR STRIP.AUTO: 5.5 [PH] (ref 5–8)
PLATELET # BLD AUTO: 202 10*3/MM3 (ref 150–450)
PMV BLD AUTO: 10.3 FL (ref 6–12)
POTASSIUM BLD-SCNC: 4 MMOL/L (ref 3.5–5.5)
PROT SERPL-MCNC: 8 G/DL (ref 5.7–8.2)
PROT UR QL STRIP: NEGATIVE
RBC # BLD AUTO: 4.83 10*6/MM3 (ref 4.2–5.76)
RBC # UR: ABNORMAL /HPF
REF LAB TEST METHOD: ABNORMAL
SODIUM BLD-SCNC: 134 MMOL/L (ref 132–146)
SP GR UR STRIP: 1.02 (ref 1–1.03)
SQUAMOUS #/AREA URNS HPF: ABNORMAL /HPF
UROBILINOGEN UR QL STRIP: ABNORMAL
WBC NRBC COR # BLD: 11.56 10*3/MM3 (ref 3.5–10.8)
WBC UR QL AUTO: ABNORMAL /HPF

## 2017-03-29 PROCEDURE — C1769 GUIDE WIRE: HCPCS | Performed by: UROLOGY

## 2017-03-29 PROCEDURE — 85025 COMPLETE CBC W/AUTO DIFF WBC: CPT | Performed by: EMERGENCY MEDICINE

## 2017-03-29 PROCEDURE — 25010000002 HYDROMORPHONE PER 4 MG: Performed by: EMERGENCY MEDICINE

## 2017-03-29 PROCEDURE — 25010000002 SUCCINYLCHOLINE PER 20 MG: Performed by: NURSE ANESTHETIST, CERTIFIED REGISTERED

## 2017-03-29 PROCEDURE — 74000 HC ABDOMEN KUB: CPT

## 2017-03-29 PROCEDURE — 25010000003 CEFAZOLIN PER 500 MG: Performed by: NURSE ANESTHETIST, CERTIFIED REGISTERED

## 2017-03-29 PROCEDURE — G0378 HOSPITAL OBSERVATION PER HR: HCPCS

## 2017-03-29 PROCEDURE — 80053 COMPREHEN METABOLIC PANEL: CPT | Performed by: EMERGENCY MEDICINE

## 2017-03-29 PROCEDURE — C2617 STENT, NON-COR, TEM W/O DEL: HCPCS | Performed by: UROLOGY

## 2017-03-29 PROCEDURE — 25010000002 KETOROLAC TROMETHAMINE PER 15 MG: Performed by: UROLOGY

## 2017-03-29 PROCEDURE — 25010000002 MIDAZOLAM PER 1 MG: Performed by: NURSE ANESTHETIST, CERTIFIED REGISTERED

## 2017-03-29 PROCEDURE — 96376 TX/PRO/DX INJ SAME DRUG ADON: CPT

## 2017-03-29 PROCEDURE — 25010000002 ONDANSETRON PER 1 MG: Performed by: NURSE ANESTHETIST, CERTIFIED REGISTERED

## 2017-03-29 PROCEDURE — 25010000002 FENTANYL CITRATE (PF) 100 MCG/2ML SOLUTION: Performed by: NURSE ANESTHETIST, CERTIFIED REGISTERED

## 2017-03-29 PROCEDURE — 25010000002 PROPOFOL 10 MG/ML EMULSION: Performed by: NURSE ANESTHETIST, CERTIFIED REGISTERED

## 2017-03-29 PROCEDURE — 25010000002 DEXAMETHASONE PER 1 MG: Performed by: NURSE ANESTHETIST, CERTIFIED REGISTERED

## 2017-03-29 PROCEDURE — 25010000002 ONDANSETRON PER 1 MG: Performed by: UROLOGY

## 2017-03-29 PROCEDURE — 76000 FLUOROSCOPY <1 HR PHYS/QHP: CPT

## 2017-03-29 PROCEDURE — 25010000002 ONDANSETRON PER 1 MG: Performed by: EMERGENCY MEDICINE

## 2017-03-29 PROCEDURE — 81001 URINALYSIS AUTO W/SCOPE: CPT | Performed by: EMERGENCY MEDICINE

## 2017-03-29 DEVICE — URETERAL STENT
Type: IMPLANTABLE DEVICE | Site: URETER | Status: FUNCTIONAL
Brand: PERCUFLEX™ PLUS

## 2017-03-29 RX ORDER — 0.9 % SODIUM CHLORIDE 0.9 %
10 VIAL (ML) INJECTION ONCE
Status: COMPLETED | OUTPATIENT
Start: 2017-03-29 | End: 2017-03-29

## 2017-03-29 RX ORDER — DEXTROSE AND SODIUM CHLORIDE 5; .45 G/100ML; G/100ML
150 INJECTION, SOLUTION INTRAVENOUS CONTINUOUS
Status: DISCONTINUED | OUTPATIENT
Start: 2017-03-29 | End: 2017-03-29 | Stop reason: HOSPADM

## 2017-03-29 RX ORDER — ONDANSETRON 2 MG/ML
4 INJECTION INTRAMUSCULAR; INTRAVENOUS ONCE
Status: COMPLETED | OUTPATIENT
Start: 2017-03-29 | End: 2017-03-29

## 2017-03-29 RX ORDER — SODIUM CHLORIDE 9 MG/ML
125 INJECTION, SOLUTION INTRAVENOUS CONTINUOUS
Status: DISCONTINUED | OUTPATIENT
Start: 2017-03-29 | End: 2017-03-29 | Stop reason: HOSPADM

## 2017-03-29 RX ORDER — CEPHALEXIN 250 MG/1
250 CAPSULE ORAL 3 TIMES DAILY
Qty: 15 CAPSULE | Refills: 0 | Status: SHIPPED | OUTPATIENT
Start: 2017-03-29 | End: 2017-04-03

## 2017-03-29 RX ORDER — MAGNESIUM HYDROXIDE 1200 MG/15ML
LIQUID ORAL AS NEEDED
Status: DISCONTINUED | OUTPATIENT
Start: 2017-03-29 | End: 2017-03-29 | Stop reason: HOSPADM

## 2017-03-29 RX ORDER — SODIUM CHLORIDE, SODIUM LACTATE, POTASSIUM CHLORIDE, AND CALCIUM CHLORIDE .6; .31; .03; .02 G/100ML; G/100ML; G/100ML; G/100ML
9 INJECTION, SOLUTION INTRAVENOUS CONTINUOUS
Status: DISCONTINUED | OUTPATIENT
Start: 2017-03-29 | End: 2017-03-29 | Stop reason: HOSPADM

## 2017-03-29 RX ORDER — MIDAZOLAM HYDROCHLORIDE 1 MG/ML
INJECTION INTRAMUSCULAR; INTRAVENOUS AS NEEDED
Status: DISCONTINUED | OUTPATIENT
Start: 2017-03-29 | End: 2017-03-29 | Stop reason: SURG

## 2017-03-29 RX ORDER — ROCURONIUM BROMIDE 10 MG/ML
INJECTION, SOLUTION INTRAVENOUS AS NEEDED
Status: DISCONTINUED | OUTPATIENT
Start: 2017-03-29 | End: 2017-03-29 | Stop reason: SURG

## 2017-03-29 RX ORDER — ONDANSETRON 2 MG/ML
4 INJECTION INTRAMUSCULAR; INTRAVENOUS ONCE AS NEEDED
Status: DISCONTINUED | OUTPATIENT
Start: 2017-03-29 | End: 2017-03-29 | Stop reason: HOSPADM

## 2017-03-29 RX ORDER — NALOXONE HCL 0.4 MG/ML
0.1 VIAL (ML) INJECTION
Status: DISCONTINUED | OUTPATIENT
Start: 2017-03-29 | End: 2017-03-29 | Stop reason: HOSPADM

## 2017-03-29 RX ORDER — FAMOTIDINE 20 MG/1
20 TABLET, FILM COATED ORAL ONCE
Status: COMPLETED | OUTPATIENT
Start: 2017-03-29 | End: 2017-03-29

## 2017-03-29 RX ORDER — HYDROMORPHONE HYDROCHLORIDE 1 MG/ML
0.5 INJECTION, SOLUTION INTRAMUSCULAR; INTRAVENOUS; SUBCUTANEOUS
Status: DISCONTINUED | OUTPATIENT
Start: 2017-03-29 | End: 2017-03-29 | Stop reason: HOSPADM

## 2017-03-29 RX ORDER — ONDANSETRON 2 MG/ML
INJECTION INTRAMUSCULAR; INTRAVENOUS AS NEEDED
Status: DISCONTINUED | OUTPATIENT
Start: 2017-03-29 | End: 2017-03-29 | Stop reason: SURG

## 2017-03-29 RX ORDER — SUCCINYLCHOLINE CHLORIDE 20 MG/ML
INJECTION INTRAMUSCULAR; INTRAVENOUS AS NEEDED
Status: DISCONTINUED | OUTPATIENT
Start: 2017-03-29 | End: 2017-03-29 | Stop reason: SURG

## 2017-03-29 RX ORDER — PROPOFOL 10 MG/ML
VIAL (ML) INTRAVENOUS AS NEEDED
Status: DISCONTINUED | OUTPATIENT
Start: 2017-03-29 | End: 2017-03-29 | Stop reason: SURG

## 2017-03-29 RX ORDER — SODIUM CHLORIDE 0.9 % (FLUSH) 0.9 %
10 SYRINGE (ML) INJECTION AS NEEDED
Status: DISCONTINUED | OUTPATIENT
Start: 2017-03-29 | End: 2017-03-29 | Stop reason: HOSPADM

## 2017-03-29 RX ORDER — OXYCODONE HYDROCHLORIDE AND ACETAMINOPHEN 5; 325 MG/1; MG/1
1 TABLET ORAL EVERY 6 HOURS PRN
Qty: 20 TABLET | Refills: 0 | Status: SHIPPED | OUTPATIENT
Start: 2017-03-29 | End: 2017-04-03

## 2017-03-29 RX ORDER — ONDANSETRON 2 MG/ML
4 INJECTION INTRAMUSCULAR; INTRAVENOUS EVERY 6 HOURS PRN
Status: DISCONTINUED | OUTPATIENT
Start: 2017-03-29 | End: 2017-03-29 | Stop reason: HOSPADM

## 2017-03-29 RX ORDER — DEXAMETHASONE SODIUM PHOSPHATE 10 MG/ML
INJECTION INTRAMUSCULAR; INTRAVENOUS AS NEEDED
Status: DISCONTINUED | OUTPATIENT
Start: 2017-03-29 | End: 2017-03-29 | Stop reason: SURG

## 2017-03-29 RX ORDER — KETOROLAC TROMETHAMINE 15 MG/ML
15 INJECTION, SOLUTION INTRAMUSCULAR; INTRAVENOUS EVERY 6 HOURS PRN
Status: DISCONTINUED | OUTPATIENT
Start: 2017-03-29 | End: 2017-03-29 | Stop reason: HOSPADM

## 2017-03-29 RX ORDER — FENTANYL CITRATE 50 UG/ML
50 INJECTION, SOLUTION INTRAMUSCULAR; INTRAVENOUS
Status: DISCONTINUED | OUTPATIENT
Start: 2017-03-29 | End: 2017-03-29 | Stop reason: HOSPADM

## 2017-03-29 RX ORDER — FENTANYL CITRATE 50 UG/ML
INJECTION, SOLUTION INTRAMUSCULAR; INTRAVENOUS AS NEEDED
Status: DISCONTINUED | OUTPATIENT
Start: 2017-03-29 | End: 2017-03-29 | Stop reason: SURG

## 2017-03-29 RX ADMIN — DEXAMETHASONE SODIUM PHOSPHATE 4 MG: 10 INJECTION INTRAMUSCULAR; INTRAVENOUS at 14:05

## 2017-03-29 RX ADMIN — SODIUM CHLORIDE, POTASSIUM CHLORIDE, SODIUM LACTATE AND CALCIUM CHLORIDE 250 ML: 600; 310; 30; 20 INJECTION, SOLUTION INTRAVENOUS at 13:49

## 2017-03-29 RX ADMIN — PROPOFOL 250 MG: 10 INJECTION, EMULSION INTRAVENOUS at 13:58

## 2017-03-29 RX ADMIN — FAMOTIDINE 20 MG: 20 TABLET, FILM COATED ORAL at 13:24

## 2017-03-29 RX ADMIN — KETOROLAC TROMETHAMINE 15 MG: 15 INJECTION, SOLUTION INTRAMUSCULAR; INTRAVENOUS at 01:30

## 2017-03-29 RX ADMIN — ONDANSETRON 4 MG: 2 INJECTION INTRAMUSCULAR; INTRAVENOUS at 01:29

## 2017-03-29 RX ADMIN — Medication: at 02:28

## 2017-03-29 RX ADMIN — MIDAZOLAM HYDROCHLORIDE 1 MG: 1 INJECTION, SOLUTION INTRAMUSCULAR; INTRAVENOUS at 14:53

## 2017-03-29 RX ADMIN — FENTANYL CITRATE 50 MCG: 50 INJECTION, SOLUTION INTRAMUSCULAR; INTRAVENOUS at 14:53

## 2017-03-29 RX ADMIN — SODIUM CHLORIDE 125 ML/HR: 9 INJECTION, SOLUTION INTRAVENOUS at 02:15

## 2017-03-29 RX ADMIN — ONDANSETRON 4 MG: 2 INJECTION INTRAMUSCULAR; INTRAVENOUS at 14:14

## 2017-03-29 RX ADMIN — MIDAZOLAM HYDROCHLORIDE 1 MG: 1 INJECTION, SOLUTION INTRAMUSCULAR; INTRAVENOUS at 13:57

## 2017-03-29 RX ADMIN — CEFAZOLIN SODIUM 1 G: 1 INJECTION, SOLUTION INTRAVENOUS at 14:01

## 2017-03-29 RX ADMIN — ROCURONIUM BROMIDE 4 MG: 10 INJECTION INTRAVENOUS at 13:57

## 2017-03-29 RX ADMIN — SUCCINYLCHOLINE CHLORIDE 140 MG: 20 INJECTION, SOLUTION INTRAMUSCULAR; INTRAVENOUS at 13:58

## 2017-03-29 RX ADMIN — HYDROMORPHONE HYDROCHLORIDE 1 MG: 1 INJECTION, SOLUTION INTRAMUSCULAR; INTRAVENOUS; SUBCUTANEOUS at 01:28

## 2017-03-29 RX ADMIN — SODIUM CHLORIDE, PRESERVATIVE FREE 10 ML: 5 INJECTION INTRAVENOUS at 13:21

## 2017-03-29 RX ADMIN — SODIUM CHLORIDE 1000 ML: 9 INJECTION, SOLUTION INTRAVENOUS at 02:30

## 2017-03-29 RX ADMIN — Medication: at 12:52

## 2017-03-29 RX ADMIN — SODIUM CHLORIDE 125 ML/HR: 9 INJECTION, SOLUTION INTRAVENOUS at 10:24

## 2017-03-29 RX ADMIN — FENTANYL CITRATE 50 MCG: 50 INJECTION, SOLUTION INTRAMUSCULAR; INTRAVENOUS at 13:57

## 2017-03-29 RX ADMIN — SODIUM CHLORIDE, POTASSIUM CHLORIDE, SODIUM LACTATE AND CALCIUM CHLORIDE 9 ML/HR: 600; 310; 30; 20 INJECTION, SOLUTION INTRAVENOUS at 13:21

## 2017-03-29 RX ADMIN — SODIUM CHLORIDE, POTASSIUM CHLORIDE, SODIUM LACTATE AND CALCIUM CHLORIDE 350 ML: 600; 310; 30; 20 INJECTION, SOLUTION INTRAVENOUS at 14:54

## 2017-03-29 RX ADMIN — KETOROLAC TROMETHAMINE 15 MG: 15 INJECTION, SOLUTION INTRAMUSCULAR; INTRAVENOUS at 08:39

## 2017-03-29 RX ADMIN — DEXTROSE AND SODIUM CHLORIDE 150 ML/HR: 5; 450 INJECTION, SOLUTION INTRAVENOUS at 01:28

## 2017-03-29 RX ADMIN — Medication: at 08:34

## 2017-03-29 RX ADMIN — ONDANSETRON 4 MG: 2 INJECTION INTRAMUSCULAR; INTRAVENOUS at 12:49

## 2017-03-29 NOTE — ANESTHESIA POSTPROCEDURE EVALUATION
Patient: Faizan Romero    Procedure Summary     Date Anesthesia Start Anesthesia Stop Room / Location    03/29/17 8424 [id number] [facility] / [facility]       Procedure Diagnosis Surgeon Provider    LEFT URETEROSCOPY, LEFT URETERAL DILATION, STENT PLACEMENT (Left ) No diagnosis on file. MD Caden Duffy MD          Anesthesia Type: general  Last vitals  BP      Temp      Pulse     Resp      SpO2        Post Anesthesia Care and Evaluation    Patient location during evaluation: PACU  Patient participation: complete - patient participated  Level of consciousness: awake and alert  Pain score: 2  Pain management: adequate  Airway patency: patent  Anesthetic complications: No anesthetic complications  PONV Status: none  Cardiovascular status: hemodynamically stable and acceptable  Respiratory status: nonlabored ventilation, acceptable and nasal cannula  Hydration status: acceptable

## 2017-03-29 NOTE — BRIEF OP NOTE
CYSTOSCOPY URETEROSCOPY STONE MANIPULATION/EXTRACTION  Procedure Note    Faizan Romero  3/29/2017    Pre-op Diagnosis:    Left ureteral calculus  Post-op Diagnosis:      Same  Procedure/CPT® Codes:      Procedure(s):  Cystoscopy withleft ureteroscopy and STENT PLACEMENT ( 4.8 x 26 Center meter)    Surgeon(s):  Niles Lomas MD    Anesthesia: General    Staff:   Circulator: Neema Cross RN  Scrub Person: Moni Lebron RN    Estimated Blood Loss: * No values recorded between 3/29/2017 12:00 AM and 3/29/2017  1:56 PM *  Urine Voided: 550 mL    Specimens:                None*      Drains:           Findings: Very tight ureter unable to advance up beyond the distal ureter.    Complications: None  To recovery room stable      Niles Lomas MD     Date: 3/29/2017  Time: 1:56 PM

## 2017-03-29 NOTE — PLAN OF CARE
Problem: Patient Care Overview (Adult)  Goal: Plan of Care Review  Outcome: Ongoing (interventions implemented as appropriate)    03/29/17 0256   Coping/Psychosocial Response Interventions   Plan Of Care Reviewed With patient;significant other   Outcome Evaluation   Outcome Summary/Follow up Plan VSS, pain controlled,        Goal: Adult Individualization and Mutuality  Outcome: Ongoing (interventions implemented as appropriate)  Goal: Discharge Needs Assessment  Outcome: Ongoing (interventions implemented as appropriate)    Problem: Pain, Acute (Adult)  Goal: Identify Related Risk Factors and Signs and Symptoms  Outcome: Ongoing (interventions implemented as appropriate)  Goal: Acceptable Pain Control/Comfort Level  Outcome: Ongoing (interventions implemented as appropriate)

## 2017-03-29 NOTE — OP NOTE
DATE OF PROCEDURE:  03/29/2017    PREOPERATIVE DIAGNOSIS: Left ureteral calculus.     POSTOPERATIVE DIAGNOSIS: Left ureteral calculus.     PROCEDURE PERFORMED: Cystoscopy with left ureteroscopy and left stent insertion, 4.8 x 26 cm.     ANESTHESIA: General.     INDICATIONS: This is a 26-year-old white male who presented to the emergency room at [facility] 5 days ago with left renal colic and a CT scan showed a 3-4 mm stone in his left upper ureter. He was discharged home with no real followup urologically and then presented to [facility] each of the last 3 consecutive days with renal colic. A CT scan shows a 3-4 mm stone in his left upper ureter and he is now admitted with severe colic overnight.     DESCRIPTION OF PROCEDURE: The patient was placed on the operating table in the dorsal lithotomy position. General endotracheal anesthesia was administered. The groin was prepped and draped in the usual sterile fashion. A 21-Albanian ACMI panendoscope was inserted under video cystoscopy. The urethra was inspected and noted to be normal. Going through the prostate and bladder neck seemed quite fixed as similar to someone who had had previous pelvic radiation and the bladder was entered. Orifices were in their normal location. The bladder was inspected circumferentially and there were no lesions, tumors, trabeculations, or other abnormalities. I cannulated the left orifice with a Sensor wire and passed that fluoroscopically up to the kidney.  I then removed the cystoscope and advanced the ACMI mini rigid ureteroscope. The orifice was quite small, so I used a Glidewire through the ureteroscope in a filiform-type configuration to assist in entry of the distal ureter and passage up the ureter. However, the ureter was extremely tight and difficult to advance the scope and so I removed the scope and with 2 wires up, I attempted to dilate his ureter with 8-Albanian and then subsequently 10-Albanian ureteral dilating  sheaths. However, both of these would buckle with any tension, not allowing very good dilatation of the ureter whatsoever. I removed these and leaving 2 wires in, I again advanced the rigid ureteroscope over one of the wires. I advanced that all the way up to the mid ureter, but I could never get to the upper ureter without undue amount of pressure on the scope. At this time, I had tried everything that I could think of without damaging his ureter and, therefore, abandoned the procedure.  I replaced the cystoscope and placed a 4.8 x 26 cm double-J stent with the string attached, although trimmed very close to the urethral meatus so that he would not accidentally remove it. He was then awakened and taken to the recovery room in stable condition after draining the bladder and there were no complications.       MD SHERON Healy/vishnu  DD: 03/29/2017 14:52:12  DT: 03/29/2017 16:11:56  Voice Rec. ID #[id number]  Voice Original ID #[id number]  Doc ID #[id number]  Rev. #0  cc:

## 2017-03-29 NOTE — PROGRESS NOTES
Discharge Planning Assessment  [facility]     Patient Name: Faizan Romero  MRN: [id number]  Today's Date: 3/29/2017    Admit Date: 3/29/2017          Discharge Needs Assessment       03/29/17 0842    Living Environment    Lives With child(adilene), dependent   pt resides in [place] with his child he has split custody of (every other week).     Living Arrangements condominium    Provides Primary Care For child(adilene)    Quality Of Family Relationships supportive;helpful;involved    Able to Return to Prior Living Arrangements yes    Discharge Needs Assessment    Concerns To Be Addressed no discharge needs identified;denies needs/concerns at this time    Readmission Within The Last 30 Days no previous admission in last 30 days    Equipment Currently Used at Home none    Equipment Needed After Discharge none    Transportation Available car;family or friend will provide    Discharge Contact Information if Applicable [phone]            Discharge Plan       03/29/17 0843    Case Management/Social Work Plan    Plan home    Patient/Family In Agreement With Plan yes    Additional Comments Spoke with pt at bedside, pt plans to return home at time of discharge. Pt denies discharge needs at this time. CM will continue to follow.        Discharge Placement     No information found                Demographic Summary       03/29/17 0839    Referral Information    Referral Source admission list    Contact Information    Permission Granted to Share Information With     Primary Care Physician Information    Name none            Functional Status       03/29/17 0839    Functional Status Current    Current Functional Level Comment see previous charting    Functional Status Prior    Ambulation 0-->independent    Transferring 0-->independent    Toileting 0-->independent    Bathing 0-->independent    Dressing 0-->independent    Eating 0-->independent    Communication 0-->understands/communicates without  difficulty    Swallowing 0-->swallows foods/liquids without difficulty    IADL    Medications independent   pt confirms he has prescription drug coverage, unsure of provider.    Meal Preparation independent    Housekeeping independent    Laundry independent    Shopping independent    Oral Care independent    Activity Tolerance    Current Activity Limitations none    Usual Activity Tolerance excellent    Current Activity Tolerance moderate    Employment/Financial    Financial Concerns none   pt reports he has insurance coverage however he is unsure of his provider. No insurance card scanned by registration, encouraged pt to present insurance cards for appropriate billing.            Psychosocial     None            Abuse/Neglect     None            Legal     None            Substance Abuse     None            Patient Forms     None          Afsaneh Cook

## 2017-03-29 NOTE — ANESTHESIA PREPROCEDURE EVALUATION
Anesthesia Evaluation     Patient summary reviewed and Nursing notes reviewed   NPO Status: > 8 hours   Airway   Mallampati: II  TM distance: >3 FB  Neck ROM: full  possible difficult intubation  Dental      Pulmonary     breath sounds clear to auscultation  Cardiovascular - normal exam    Rhythm: regular  Rate: normal        Neuro/Psych  GI/Hepatic/Renal/Endo    (+)  chronic renal disease (Creat 1.3) stones,     Musculoskeletal     Abdominal    Substance History      OB/GYN          Other            Phys Exam Other: No murmur heard - Asymptomatic                         Anesthesia Plan    ASA 2     general   (RSI as nauseated)  intravenous induction   Anesthetic plan and risks discussed with patient.    Plan discussed with CRNA.

## 2017-03-29 NOTE — H&P
CHIEF COMPLAINT: Left flank pain.     HISTORY OF PRESENTING ILLNESS: This is a 26-year-old white male who was in good health up until 5 days ago when he began having acute onset of pain in his left back. He was initially seen at the [facility] emergency room where he was worked up and diagnosed with a left ureteral calculus. He was not given a strainer or any urologic follow up. The following day, his pain became more severe and he presented to the [facility] emergency room. He has been here each of the last 3 days sequentially for pain relief and I was called for admission and resolution of his ureteral calculus. He has no prior history of ureteral stones. He has some nausea, no vomiting.  He denies any fever or chills.     PAST MEDICAL HISTORY:  Significant for some sort of cervical cyst excision as a child. Medical illness illnesses, none. Tobacco, none. Alcohol, rare.     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     REVIEW OF SYSTEMS: He denies any fever or chills.     PHYSICAL EXAMINATION:  GENERAL: Well-developed, well-nourished, white male in moderate distress secondary to flank pain.   HEENT: PERRLA. EOMI.  Nose and throat clear.   HEART: Regular rate and rhythm without rubs, gallops, murmurs.   LUNGS: Clear to auscultation. His back as positive left CVA tenderness, none on the right.   GENITALIA: Within normal limits.   EXTREMITIES: Free of cyanosis, clubbing, or edema.   NEUROLOGICALLY: Grossly intact.     DIAGNOSTIC DATA: Urinalysis 1.023, 5.5, 0-2 reds, 0-2 whites.  His white count is 11,600, hemoglobin and hematocrit is 13.7, hematocrit 40.6, with 202,000 platelets. His CMP is all within normal including a potassium 4.0.  BUN of 18 and a creatinine of 1.3. CT scan shows a 3-4 mm stone in his left mid ureter.     IMPRESSION: Left renal colic secondary to ureteral calculus.     PLAN: Admit, hydrate, parenteral narcotics, and antiemetics.  He will probably need surgical stone removal.         MD SHERON Healy/dorinda  DD: 03/29/2017 08:47:57  DT: 03/29/2017 10:23:40  Voice Rec. ID #[id number]  Voice Original ID #[id number]  Doc ID #[id number]  Rev. #0  cc:

## 2017-03-29 NOTE — ED PROVIDER NOTES
Subjective   Patient is a 26 y.o. male presenting with flank pain.   Flank Pain   Pain location:  L flank  Pain radiates to:  Suprapubic region  Onset quality:  Gradual  Timing:  Intermittent  Progression:  Waxing and waning  Chronicity:  Recurrent  Context: not alcohol use, not diet changes, not eating, not medication withdrawal, not previous surgeries, not recent illness, not recent sexual activity, not recent travel, not retching, not sick contacts and not suspicious food intake    Relieved by:  Nothing  Worsened by:  Nothing  Ineffective treatments:  NSAIDs and OTC medications (Positive Percocet, treated with Dilaudid last time with continued pain with Dilaudid intermittent marked and severe pain)  Associated symptoms: nausea and vomiting    Associated symptoms: no belching, no chest pain, no chills, no constipation, no cough, no diarrhea, no dysuria, no fever, no hematemesis, no hematochezia, no hematuria, no melena, no shortness of breath and no sore throat    Nausea:     Severity:  Moderate    Onset quality:  Gradual    Timing:  Intermittent  Vomiting:     Severity:  Moderate    Timing:  Intermittent    Progression:  Unchanged  Risk factors: no alcohol abuse, not elderly and no recent hospitalization        Review of Systems   Constitutional: Negative.  Negative for chills and fever.   HENT: Negative.  Negative for rhinorrhea and sore throat.    Eyes: Negative.    Respiratory: Negative.  Negative for cough, chest tightness and shortness of breath.    Cardiovascular: Negative.  Negative for chest pain.   Gastrointestinal: Positive for nausea and vomiting. Negative for blood in stool, constipation, diarrhea, hematemesis, hematochezia and melena.   Genitourinary: Positive for flank pain. Negative for dysuria and hematuria.   Musculoskeletal: Negative for myalgias.   Skin: Negative.    Neurological: Negative.  Negative for weakness and numbness.   Psychiatric/Behavioral: Negative.    All other systems reviewed  and are negative.      Past Medical History:   Diagnosis Date   • Kidney stone    • Renal disorder        Allergies   Allergen Reactions   • Shellfish-Derived Products        History reviewed. No pertinent surgical history.    History reviewed. No pertinent family history.    Social History     Social History   • Marital status: Single     Spouse name: N/A   • Number of children: N/A   • Years of education: N/A     Social History Main Topics   • Smoking status: Never Smoker   • Smokeless tobacco: None   • Alcohol use 1.8 oz/week     3 Cans of beer per week      Comment: pt drinks once a week   • Drug use: No   • Sexual activity: Defer     Other Topics Concern   • None     Social History Narrative           Objective   Physical Exam   Constitutional: He is oriented to person, place, and time. He appears well-developed and well-nourished. No distress.   Agent is diaphoretic, and significant discomfort on examination   HENT:   Head: Normocephalic and atraumatic.   Nose: Nose normal.   Mouth/Throat: Oropharynx is clear and moist.   Eyes: Conjunctivae are normal.   Neck: Normal range of motion. Neck supple.   Cardiovascular: Normal rate, regular rhythm, normal heart sounds and intact distal pulses.  Exam reveals no gallop and no friction rub.    No murmur heard.  Pulmonary/Chest: Effort normal and breath sounds normal. No respiratory distress. He has no wheezes. He has no rales. He exhibits no tenderness.   Abdominal: Soft. Bowel sounds are normal. He exhibits no distension. There is no tenderness. There is no rebound and no guarding.   Musculoskeletal: Normal range of motion. He exhibits no edema.   Neurological: He is alert and oriented to person, place, and time. He has normal reflexes. He exhibits normal muscle tone.   Skin: Skin is warm and dry.   Psychiatric: He has a normal mood and affect. His behavior is normal.   Nursing note and vitals reviewed.      Procedures         ED Course  ED Course   Comment By Time    Discussed with Dr. Lomas.  He will admit for definitive inpatient care Jono Brothers MD 03/29 0059   Is significant improved after analgesia.  Labs are pending.  X-ray is pending.I've updated the patient with the plan of care and admission for definitive inpatient treatment.  He agrees with plan of care. Jono Brothers MD 03/29 0133                Patient had intractable pain despite escalating analgesics.  He arrives diaphoretic and in significant discomfort.  I discussed admission with the patient, who is very relieved and painful.  MDM    Final diagnoses:   Renal colic   Nephrolithiasis   Intractable pain            Jono Brothers MD  03/29/17 0452       Jono Brothers MD  03/29/17 0453

## 2017-03-29 NOTE — ANESTHESIA PROCEDURE NOTES
Airway  Urgency: elective    Airway not difficult    General Information and Staff    Patient location during procedure: OR    Indications and Patient Condition  Indications for airway management: airway protection    Preoxygenated: yes  Mask difficulty assessment: 1 - vent by mask    Final Airway Details  Final airway type: endotracheal airway      Successful airway: ETT  Cuffed: yes   Successful intubation technique: direct laryngoscopy  Facilitating devices/methods: intubating stylet and cricoid pressure  Endotracheal tube insertion site: oral  Blade: Ford  Blade size: #2  Cormack-Lehane Classification: grade I - full view of glottis  Placement verified by: chest auscultation and capnometry   Measured from: lips  ETT to lips (cm): 21  Number of attempts at approach: 1

## 2017-04-12 ENCOUNTER — ANESTHESIA EVENT (OUTPATIENT)
Dept: PERIOP | Facility: HOSPITAL | Age: 27
End: 2017-04-12

## 2017-04-12 ENCOUNTER — ANESTHESIA (OUTPATIENT)
Dept: PERIOP | Facility: HOSPITAL | Age: 27
End: 2017-04-12

## 2017-04-12 ENCOUNTER — APPOINTMENT (OUTPATIENT)
Dept: GENERAL RADIOLOGY | Facility: HOSPITAL | Age: 27
End: 2017-04-12

## 2017-04-12 ENCOUNTER — HOSPITAL ENCOUNTER (OUTPATIENT)
Facility: HOSPITAL | Age: 27
Setting detail: HOSPITAL OUTPATIENT SURGERY
Discharge: HOME | End: 2017-04-12
Attending: UROLOGY | Admitting: UROLOGY

## 2017-04-12 VITALS
DIASTOLIC BLOOD PRESSURE: 99 MMHG | OXYGEN SATURATION: 97 % | BODY MASS INDEX: 33.18 KG/M2 | HEIGHT: 72 IN | WEIGHT: 245 LBS | TEMPERATURE: 97.8 F | RESPIRATION RATE: 16 BRPM | HEART RATE: 98 BPM | SYSTOLIC BLOOD PRESSURE: 148 MMHG

## 2017-04-12 DIAGNOSIS — N20.0 KIDNEY STONE: ICD-10-CM

## 2017-04-12 PROCEDURE — 76000 FLUOROSCOPY <1 HR PHYS/QHP: CPT

## 2017-04-12 PROCEDURE — 25010000002 ONDANSETRON PER 1 MG: Performed by: NURSE ANESTHETIST, CERTIFIED REGISTERED

## 2017-04-12 PROCEDURE — 82360 CALCULUS ASSAY QUANT: CPT | Performed by: UROLOGY

## 2017-04-12 PROCEDURE — 25010000002 PROPOFOL 10 MG/ML EMULSION: Performed by: NURSE ANESTHETIST, CERTIFIED REGISTERED

## 2017-04-12 PROCEDURE — 25010000002 DEXAMETHASONE PER 1 MG: Performed by: NURSE ANESTHETIST, CERTIFIED REGISTERED

## 2017-04-12 PROCEDURE — C1769 GUIDE WIRE: HCPCS | Performed by: UROLOGY

## 2017-04-12 PROCEDURE — 25010000003 CEFAZOLIN IN DEXTROSE 2-4 GM/100ML-% SOLUTION: Performed by: NURSE ANESTHETIST, CERTIFIED REGISTERED

## 2017-04-12 PROCEDURE — C2617 STENT, NON-COR, TEM W/O DEL: HCPCS | Performed by: UROLOGY

## 2017-04-12 PROCEDURE — 25010000002 MIDAZOLAM PER 1 MG: Performed by: NURSE ANESTHETIST, CERTIFIED REGISTERED

## 2017-04-12 PROCEDURE — 25010000002 FENTANYL CITRATE (PF) 100 MCG/2ML SOLUTION: Performed by: NURSE ANESTHETIST, CERTIFIED REGISTERED

## 2017-04-12 PROCEDURE — C1894 INTRO/SHEATH, NON-LASER: HCPCS | Performed by: UROLOGY

## 2017-04-12 PROCEDURE — C1726 CATH, BAL DIL, NON-VASCULAR: HCPCS | Performed by: UROLOGY

## 2017-04-12 PROCEDURE — 0 IOPAMIDOL 61 % SOLUTION: Performed by: UROLOGY

## 2017-04-12 DEVICE — URETERAL STENT
Type: IMPLANTABLE DEVICE | Site: URETER | Status: FUNCTIONAL
Brand: PERCUFLEX™ PLUS

## 2017-04-12 RX ORDER — FAMOTIDINE 20 MG/1
20 TABLET, FILM COATED ORAL
Status: DISCONTINUED | OUTPATIENT
Start: 2017-04-12 | End: 2017-04-12 | Stop reason: HOSPADM

## 2017-04-12 RX ORDER — MAGNESIUM HYDROXIDE 1200 MG/15ML
LIQUID ORAL AS NEEDED
Status: DISCONTINUED | OUTPATIENT
Start: 2017-04-12 | End: 2017-04-12 | Stop reason: HOSPADM

## 2017-04-12 RX ORDER — FENTANYL CITRATE 50 UG/ML
50 INJECTION, SOLUTION INTRAMUSCULAR; INTRAVENOUS
Status: DISCONTINUED | OUTPATIENT
Start: 2017-04-12 | End: 2017-04-12 | Stop reason: HOSPADM

## 2017-04-12 RX ORDER — ONDANSETRON 2 MG/ML
4 INJECTION INTRAMUSCULAR; INTRAVENOUS ONCE AS NEEDED
Status: DISCONTINUED | OUTPATIENT
Start: 2017-04-12 | End: 2017-04-12 | Stop reason: HOSPADM

## 2017-04-12 RX ORDER — DEXAMETHASONE SODIUM PHOSPHATE 10 MG/ML
INJECTION INTRAMUSCULAR; INTRAVENOUS AS NEEDED
Status: DISCONTINUED | OUTPATIENT
Start: 2017-04-12 | End: 2017-04-12 | Stop reason: SURG

## 2017-04-12 RX ORDER — FAMOTIDINE 10 MG/ML
20 INJECTION, SOLUTION INTRAVENOUS
Status: DISCONTINUED | OUTPATIENT
Start: 2017-04-12 | End: 2017-04-12 | Stop reason: HOSPADM

## 2017-04-12 RX ORDER — MIDAZOLAM HYDROCHLORIDE 1 MG/ML
INJECTION INTRAMUSCULAR; INTRAVENOUS AS NEEDED
Status: DISCONTINUED | OUTPATIENT
Start: 2017-04-12 | End: 2017-04-12 | Stop reason: SURG

## 2017-04-12 RX ORDER — SODIUM CHLORIDE 0.9 % (FLUSH) 0.9 %
1-10 SYRINGE (ML) INJECTION AS NEEDED
Status: DISCONTINUED | OUTPATIENT
Start: 2017-04-12 | End: 2017-04-12 | Stop reason: HOSPADM

## 2017-04-12 RX ORDER — LIDOCAINE HYDROCHLORIDE 20 MG/ML
INJECTION, SOLUTION INFILTRATION; PERINEURAL AS NEEDED
Status: DISCONTINUED | OUTPATIENT
Start: 2017-04-12 | End: 2017-04-12 | Stop reason: SURG

## 2017-04-12 RX ORDER — FENTANYL CITRATE 50 UG/ML
INJECTION, SOLUTION INTRAMUSCULAR; INTRAVENOUS AS NEEDED
Status: DISCONTINUED | OUTPATIENT
Start: 2017-04-12 | End: 2017-04-12 | Stop reason: SURG

## 2017-04-12 RX ORDER — CEPHALEXIN 250 MG/1
250 CAPSULE ORAL 3 TIMES DAILY
Qty: 15 CAPSULE | Refills: 0 | Status: SHIPPED | OUTPATIENT
Start: 2017-04-12 | End: 2017-04-17

## 2017-04-12 RX ORDER — HYDROMORPHONE HYDROCHLORIDE 1 MG/ML
0.5 INJECTION, SOLUTION INTRAMUSCULAR; INTRAVENOUS; SUBCUTANEOUS
Status: DISCONTINUED | OUTPATIENT
Start: 2017-04-12 | End: 2017-04-12 | Stop reason: HOSPADM

## 2017-04-12 RX ORDER — OXYCODONE AND ACETAMINOPHEN 7.5; 325 MG/1; MG/1
1 TABLET ORAL ONCE AS NEEDED
Status: COMPLETED | OUTPATIENT
Start: 2017-04-12 | End: 2017-04-12

## 2017-04-12 RX ORDER — CEFAZOLIN SODIUM 2 G/100ML
INJECTION, SOLUTION INTRAVENOUS AS NEEDED
Status: DISCONTINUED | OUTPATIENT
Start: 2017-04-12 | End: 2017-04-12 | Stop reason: SURG

## 2017-04-12 RX ORDER — PROPOFOL 10 MG/ML
VIAL (ML) INTRAVENOUS AS NEEDED
Status: DISCONTINUED | OUTPATIENT
Start: 2017-04-12 | End: 2017-04-12 | Stop reason: SURG

## 2017-04-12 RX ORDER — HYDROCODONE BITARTRATE AND ACETAMINOPHEN 5; 325 MG/1; MG/1
1 TABLET ORAL EVERY 6 HOURS PRN
Qty: 20 TABLET | Refills: 0 | Status: SHIPPED | OUTPATIENT
Start: 2017-04-12 | End: 2017-04-14

## 2017-04-12 RX ORDER — ONDANSETRON 2 MG/ML
INJECTION INTRAMUSCULAR; INTRAVENOUS AS NEEDED
Status: DISCONTINUED | OUTPATIENT
Start: 2017-04-12 | End: 2017-04-12 | Stop reason: SURG

## 2017-04-12 RX ORDER — SODIUM CHLORIDE, SODIUM LACTATE, POTASSIUM CHLORIDE, CALCIUM CHLORIDE 600; 310; 30; 20 MG/100ML; MG/100ML; MG/100ML; MG/100ML
9 INJECTION, SOLUTION INTRAVENOUS CONTINUOUS PRN
Status: DISCONTINUED | OUTPATIENT
Start: 2017-04-12 | End: 2017-04-12 | Stop reason: HOSPADM

## 2017-04-12 RX ORDER — LIDOCAINE HYDROCHLORIDE 10 MG/ML
0.5 INJECTION, SOLUTION EPIDURAL; INFILTRATION; INTRACAUDAL; PERINEURAL ONCE AS NEEDED
Status: COMPLETED | OUTPATIENT
Start: 2017-04-12 | End: 2017-04-12

## 2017-04-12 RX ADMIN — LIDOCAINE HYDROCHLORIDE 0.2 ML: 10 INJECTION, SOLUTION EPIDURAL; INFILTRATION; INTRACAUDAL; PERINEURAL at 10:05

## 2017-04-12 RX ADMIN — FENTANYL CITRATE 50 MCG: 50 INJECTION, SOLUTION INTRAMUSCULAR; INTRAVENOUS at 11:34

## 2017-04-12 RX ADMIN — OXYCODONE HYDROCHLORIDE AND ACETAMINOPHEN 1 TABLET: 7.5; 325 TABLET ORAL at 13:25

## 2017-04-12 RX ADMIN — PROPOFOL 50 MG: 10 INJECTION, EMULSION INTRAVENOUS at 12:27

## 2017-04-12 RX ADMIN — LIDOCAINE HYDROCHLORIDE 50 MG: 20 INJECTION, SOLUTION INFILTRATION; PERINEURAL at 11:21

## 2017-04-12 RX ADMIN — CEFAZOLIN SODIUM 2 G: 2 INJECTION, SOLUTION INTRAVENOUS at 11:19

## 2017-04-12 RX ADMIN — PROPOFOL 250 MG: 10 INJECTION, EMULSION INTRAVENOUS at 11:22

## 2017-04-12 RX ADMIN — DEXAMETHASONE SODIUM PHOSPHATE 4 MG: 10 INJECTION INTRAMUSCULAR; INTRAVENOUS at 11:28

## 2017-04-12 RX ADMIN — ONDANSETRON 4 MG: 2 INJECTION INTRAMUSCULAR; INTRAVENOUS at 12:07

## 2017-04-12 RX ADMIN — FENTANYL CITRATE 50 MCG: 50 INJECTION, SOLUTION INTRAMUSCULAR; INTRAVENOUS at 13:15

## 2017-04-12 RX ADMIN — PROPOFOL 50 MG: 10 INJECTION, EMULSION INTRAVENOUS at 11:34

## 2017-04-12 RX ADMIN — FENTANYL CITRATE 50 MCG: 50 INJECTION, SOLUTION INTRAMUSCULAR; INTRAVENOUS at 11:51

## 2017-04-12 RX ADMIN — MIDAZOLAM HYDROCHLORIDE 2 MG: 1 INJECTION, SOLUTION INTRAMUSCULAR; INTRAVENOUS at 11:18

## 2017-04-12 RX ADMIN — FAMOTIDINE 20 MG: 20 TABLET ORAL at 10:12

## 2017-04-12 RX ADMIN — SODIUM CHLORIDE, POTASSIUM CHLORIDE, SODIUM LACTATE AND CALCIUM CHLORIDE 9 ML/HR: 600; 310; 30; 20 INJECTION, SOLUTION INTRAVENOUS at 10:05

## 2017-04-12 NOTE — H&P
"From 3/29/17 Dictated H&P with updates:    CHIEF COMPLAINT: Left flank pain.      HISTORY OF PRESENTING ILLNESS: This is a 26-year-old white male who was in good health up until 5 days ago when he began having acute onset of pain in his left back. He was initially seen at the [facility] emergency room where he was worked up and diagnosed with a left ureteral calculus. He was not given a strainer or any urologic follow up. The following day, his pain became more severe and he presented to the [facility] emergency room. He has been here each of the last 3 days sequentially for pain relief and I was called for admission and resolution of his ureteral calculus. He has no prior history of ureteral stones. He has some nausea, no vomiting. He denies any fever or chills. Underwent Cystoscopy, left ureteroscopy and stent insertion 3/29/17 at [facility].  Saw Dr. Lomas on Monday with no passing of stone.  No change since office visit and denies fever, chills, CP, Palps, SOA.  Here today for cystoscopy, left ureteroscopy, stone manipulation/extraction.       PAST MEDICAL HISTORY: Significant for some sort of cervical cyst excision as a child. Medical illness illnesses, none. Tobacco, none. Alcohol, rare.      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      IMM:  Influenza no  Pneumococcal no  Tetanus Unknown  PHYSICAL EXAMINATION:    /81 (BP Location: Right arm, Patient Position: Lying)  Pulse 82  Temp 97.4 °F (36.3 °C) (Temporal Artery )   Resp 18  Ht 72\" (182.9 cm)  Wt 245 lb (111 kg)  SpO2 99%  BMI 33.23 kg/m2  CV: S1S2 reg no murmur  Resp:  CTAB       IMPRESSION/Plan: Left ureteral calculus-Cystoscopy, left ureteroscopy with stone manipulation/extraction  "

## 2017-04-12 NOTE — ANESTHESIA PROCEDURE NOTES
Airway  Urgency: elective    Airway not difficult    General Information and Staff    Patient location during procedure: OR    Indications and Patient Condition  Indications for airway management: airway protection    Preoxygenated: yes  Mask difficulty assessment: 1 - vent by mask    Final Airway Details  Final airway type: supraglottic airway      Successful airway: Size 5    Number of attempts at approach: 1

## 2017-04-12 NOTE — BRIEF OP NOTE
CYSTOSCOPY URETEROSCOPY STONE MANIPULATION/EXTRACTION  Procedure Note    Faizan Romero  4/12/2017    Pre-op diagnosis: Left ureteral calculus  Post-op Diagnosis:      Same  Procedure/CPT® Codes:      Procedure(s):   CYSTOSCOPY WITH LEFT URETEROSCOPY with laser lithotripsy and basket STONE EXTRACTION and stent insertion 6 Nepali by 26 cm    Surgeon(s):  Niles Lomas MD    Anesthesia: General    Staff:   Circulator: Tiffany Pyle RN  Scrub Person: Evangelina Ge RN    Estimated Blood Loss: None   Urine Voided: * No values recorded between 4/12/2017 12:00 AM and 4/12/2017 11:14 AM *    Specimens:                Left ureteral stone      Drains:           Findings: Very tight mid and distal ureter requiring balloon ureteral dilation    Complications: None, to recovery room stable      Niles Lomas MD     Date: 4/12/2017  Time: 11:14 AM

## 2017-04-12 NOTE — OP NOTE
DATE OF PROCEDURE:  04/12/2017    PREOPERATIVE DIAGNOSIS: Left ureteral calculus.     POSTOPERATIVE DIAGNOSIS: Left ureteral calculus.     PROCEDURES PERFORMED:  1. Cystoscopy.   2. Left stent removal.   3. Left ureteroscopy with ureteral dilation.  4. Laser lithotripsy.   5. Basket stone extraction.  6. Stent insertion, 6-Scottish x 26 cm.     SURGEON: Niles Lomas MD    ANESTHESIA: General.     INDICATIONS: This is a young man who has a 3-4 mm stone in his left upper ureter. Ten days ago I tried ureteroscopy but was unable to pass a scope up through his mid and distal ureter in order to reach the stone, so he was left with an indwelling stent. He now returns for stone removal.     DESCRIPTION OF PROCEDURE: The patient was placed on the operating table in the dorsal lithotomy position. General endotracheal anesthesia was administered. The groin was prepped and draped in the usual sterile fashion. A 21-Scottish ACMI panendoscope was inserted under video cystoscopy. The urethra was inspected and noted to be normal. The prostate was entered. He again has sort of a high-riding bladder neck configuration that really requires tilting of the scope to enter his bladder. The stent was grasped and brought out to the urethral meatus and cannulated with a Sensor wire, and the old stent was removed. I then advanced the ACMI mini ureteroscope, rigid, and up the urethra and up the distal ureter. Even with the assistance of a Glidewire acting as a filiform, I could not traverse his mid and distal ureter with either the rigid or the flexible ureteroscope, even after having a stent in for 10 days.     I then used a 10/12 ureteral access sheath, but I could not advance that up the midureter either without it really bending and flexing and not advancing. Therefore, I used a 4 cm x 5 mm ureteral dilating balloon, and I inflated that on 3 different occasions to 18 atmospheres of pressure sequentially in the midureter to dilate that  area. That enabled me to slide the flexible ureteroscope up to the upper ureter through the ureteral access catheter and encounter the stone. The 200 micron holmium laser fiber was then used to fragment the stone. One remaining fragment was removed with a 1.8 Nitinol basket and sent for stone analysis. I then advanced the ureteroscope all the way up to the UPJ and slowly extracted it the entire length of the ureter and no other stone fragments were encountered. While doing this, I replaced the Sensor wire and removed the ureteroscope. I replaced the 21-Spanish cystoscope and placed a 6-Spanish x 26 cm double-J stent with a good curl in the kidney and bladder. The string was taped to the penis. The bladder was drained. The scope was removed atraumatically. He was taken to the recovery room in stable condition. There were no complications.       MD SHERON Healy/gavino  DD: 04/12/2017 12:38:52  DT: 04/12/2017 13:35:03  Voice Rec. ID #[id number]  Voice Original ID #[id number]  Doc ID #[id number]  Rev. #0  cc:

## 2017-04-12 NOTE — ANESTHESIA POSTPROCEDURE EVALUATION
Patient: Faizan Romero    Procedure Summary     Date Anesthesia Start Anesthesia Stop Room / Location    04/12/17 1117 1242 [facility] / [facility]       Procedure Diagnosis Surgeon Provider    LEFT CYSTOSCOPY URETEROSCOPY STONE MANIPULATION/EXTRACTION, LASER LITHOTRIPSY LEFT URETERAL DILATION, REMOVAL OF STENT, PLACEMENT JJ STENT 6X26 (Left ) No diagnosis on file. MD Too Duffy MD          Anesthesia Type: general  Last vitals  BP      Temp      Pulse     Resp      SpO2        Post Anesthesia Care and Evaluation    Patient location during evaluation: PACU  Patient participation: complete - patient participated  Level of consciousness: obtunded/minimal responses  Pain score: 0  Pain management: adequate  Airway patency: patent  Anesthetic complications: No anesthetic complications  PONV Status: none  Cardiovascular status: hemodynamically stable and acceptable  Respiratory status: nonlabored ventilation, acceptable and nasal cannula  Hydration status: acceptable

## 2017-04-12 NOTE — ANESTHESIA PREPROCEDURE EVALUATION
Anesthesia Evaluation     Patient summary reviewed and Nursing notes reviewed   no history of anesthetic complications:  NPO Status: > 8 hours   Airway   Mallampati: III  TM distance: >3 FB  Neck ROM: full  possible difficult intubation  Dental - normal exam     Pulmonary - negative pulmonary ROS and normal exam    breath sounds clear to auscultation  Cardiovascular - negative cardio ROS and normal exam  Exercise tolerance: good (4-7 METS)    Rhythm: regular  Rate: normal        Neuro/Psych- negative ROS  GI/Hepatic/Renal/Endo    (+) morbid obesity, chronic renal disease stones,     Musculoskeletal     Abdominal   (+) obese,     Abdomen: soft.   Substance History      OB/GYN          Other                                    Anesthesia Plan    ASA 3     general     intravenous induction   Anesthetic plan and risks discussed with patient.    Plan discussed with CRNA.

## 2017-04-14 ENCOUNTER — APPOINTMENT (OUTPATIENT)
Dept: CT IMAGING | Facility: HOSPITAL | Age: 27
End: 2017-04-14

## 2017-04-14 ENCOUNTER — HOSPITAL ENCOUNTER (OUTPATIENT)
Facility: HOSPITAL | Age: 27
Setting detail: OBSERVATION
LOS: 1 days | Discharge: HOME | End: 2017-04-16
Attending: EMERGENCY MEDICINE | Admitting: UROLOGY

## 2017-04-14 DIAGNOSIS — R10.9 LEFT FLANK PAIN: Primary | ICD-10-CM

## 2017-04-14 DIAGNOSIS — R10.9 INTRACTABLE ABDOMINAL PAIN: ICD-10-CM

## 2017-04-14 LAB
ALBUMIN SERPL-MCNC: 4.6 G/DL (ref 3.2–4.8)
ALBUMIN/GLOB SERPL: 1.5 G/DL (ref 1.5–2.5)
ALP SERPL-CCNC: 100 U/L (ref 25–100)
ALT SERPL W P-5'-P-CCNC: 34 U/L (ref 7–40)
ANION GAP SERPL CALCULATED.3IONS-SCNC: 21 MMOL/L (ref 3–11)
AST SERPL-CCNC: 22 U/L (ref 0–33)
BACTERIA UR QL AUTO: ABNORMAL /HPF
BASOPHILS # BLD AUTO: 0.07 10*3/MM3 (ref 0–0.2)
BASOPHILS NFR BLD AUTO: 0.5 % (ref 0–1)
BILIRUB SERPL-MCNC: 1.2 MG/DL (ref 0.3–1.2)
BILIRUB UR QL STRIP: ABNORMAL
BUN BLD-MCNC: 14 MG/DL (ref 9–23)
BUN/CREAT SERPL: 17.5 (ref 7–25)
CALCIUM SPEC-SCNC: 10.1 MG/DL (ref 8.7–10.4)
CHLORIDE SERPL-SCNC: 103 MMOL/L (ref 99–109)
CLARITY UR: ABNORMAL
CO2 SERPL-SCNC: 16 MMOL/L (ref 20–31)
COLOR UR: ABNORMAL
CREAT BLD-MCNC: 0.8 MG/DL (ref 0.6–1.3)
DEPRECATED RDW RBC AUTO: 40.4 FL (ref 37–54)
EOSINOPHIL # BLD AUTO: 0.29 10*3/MM3 (ref 0.1–0.3)
EOSINOPHIL NFR BLD AUTO: 2.2 % (ref 0–3)
ERYTHROCYTE [DISTWIDTH] IN BLOOD BY AUTOMATED COUNT: 12.9 % (ref 11.3–14.5)
GFR SERPL CREATININE-BSD FRML MDRD: 119 ML/MIN/1.73
GLOBULIN UR ELPH-MCNC: 3 GM/DL
GLUCOSE BLD-MCNC: 94 MG/DL (ref 70–100)
GLUCOSE UR STRIP-MCNC: NEGATIVE MG/DL
HCT VFR BLD AUTO: 44.8 % (ref 38.9–50.9)
HGB BLD-MCNC: 14.6 G/DL (ref 13.1–17.5)
HGB UR QL STRIP.AUTO: ABNORMAL
HYALINE CASTS UR QL AUTO: ABNORMAL /LPF
IMM GRANULOCYTES # BLD: 0.09 10*3/MM3 (ref 0–0.03)
IMM GRANULOCYTES NFR BLD: 0.7 % (ref 0–0.6)
KETONES UR QL STRIP: ABNORMAL
LEUKOCYTE ESTERASE UR QL STRIP.AUTO: ABNORMAL
LYMPHOCYTES # BLD AUTO: 3.01 10*3/MM3 (ref 0.6–4.8)
LYMPHOCYTES NFR BLD AUTO: 22.5 % (ref 24–44)
MCH RBC QN AUTO: 27.9 PG (ref 27–31)
MCHC RBC AUTO-ENTMCNC: 32.6 G/DL (ref 32–36)
MCV RBC AUTO: 85.7 FL (ref 80–99)
MONOCYTES # BLD AUTO: 0.95 10*3/MM3 (ref 0–1)
MONOCYTES NFR BLD AUTO: 7.1 % (ref 0–12)
NEUTROPHILS # BLD AUTO: 8.97 10*3/MM3 (ref 1.5–8.3)
NEUTROPHILS NFR BLD AUTO: 67 % (ref 41–71)
NITRITE UR QL STRIP: POSITIVE
PH UR STRIP.AUTO: <=5 [PH] (ref 5–8)
PLATELET # BLD AUTO: 246 10*3/MM3 (ref 150–450)
PMV BLD AUTO: 10.4 FL (ref 6–12)
POTASSIUM BLD-SCNC: 3.6 MMOL/L (ref 3.5–5.5)
PROT SERPL-MCNC: 7.6 G/DL (ref 5.7–8.2)
PROT UR QL STRIP: ABNORMAL
RBC # BLD AUTO: 5.23 10*6/MM3 (ref 4.2–5.76)
RBC # UR: ABNORMAL /HPF
REF LAB TEST METHOD: ABNORMAL
SODIUM BLD-SCNC: 140 MMOL/L (ref 132–146)
SP GR UR STRIP: 1.02 (ref 1–1.03)
SQUAMOUS #/AREA URNS HPF: ABNORMAL /HPF
UROBILINOGEN UR QL STRIP: ABNORMAL
WBC NRBC COR # BLD: 13.38 10*3/MM3 (ref 3.5–10.8)
WBC UR QL AUTO: ABNORMAL /HPF

## 2017-04-14 PROCEDURE — 81001 URINALYSIS AUTO W/SCOPE: CPT | Performed by: PHYSICIAN ASSISTANT

## 2017-04-14 PROCEDURE — 87086 URINE CULTURE/COLONY COUNT: CPT | Performed by: PHYSICIAN ASSISTANT

## 2017-04-14 PROCEDURE — 74176 CT ABD & PELVIS W/O CONTRAST: CPT

## 2017-04-14 PROCEDURE — G0378 HOSPITAL OBSERVATION PER HR: HCPCS

## 2017-04-14 PROCEDURE — 96361 HYDRATE IV INFUSION ADD-ON: CPT

## 2017-04-14 PROCEDURE — 80053 COMPREHEN METABOLIC PANEL: CPT | Performed by: PHYSICIAN ASSISTANT

## 2017-04-14 PROCEDURE — 96374 THER/PROPH/DIAG INJ IV PUSH: CPT

## 2017-04-14 PROCEDURE — 99284 EMERGENCY DEPT VISIT MOD MDM: CPT

## 2017-04-14 PROCEDURE — 25010000002 ONDANSETRON PER 1 MG: Performed by: UROLOGY

## 2017-04-14 PROCEDURE — 85025 COMPLETE CBC W/AUTO DIFF WBC: CPT | Performed by: PHYSICIAN ASSISTANT

## 2017-04-14 PROCEDURE — 25010000002 HYDROMORPHONE PER 4 MG: Performed by: EMERGENCY MEDICINE

## 2017-04-14 PROCEDURE — 25010000002 ONDANSETRON PER 1 MG: Performed by: PHYSICIAN ASSISTANT

## 2017-04-14 PROCEDURE — 96375 TX/PRO/DX INJ NEW DRUG ADDON: CPT

## 2017-04-14 PROCEDURE — 96376 TX/PRO/DX INJ SAME DRUG ADON: CPT

## 2017-04-14 RX ORDER — DEXTROSE AND SODIUM CHLORIDE 5; .45 G/100ML; G/100ML
100 INJECTION, SOLUTION INTRAVENOUS CONTINUOUS
Status: DISCONTINUED | OUTPATIENT
Start: 2017-04-14 | End: 2017-04-16

## 2017-04-14 RX ORDER — ONDANSETRON 2 MG/ML
4 INJECTION INTRAMUSCULAR; INTRAVENOUS ONCE
Status: COMPLETED | OUTPATIENT
Start: 2017-04-14 | End: 2017-04-14

## 2017-04-14 RX ORDER — OXYCODONE AND ACETAMINOPHEN 7.5; 325 MG/1; MG/1
1 TABLET ORAL EVERY 6 HOURS PRN
COMMUNITY
End: 2021-06-08

## 2017-04-14 RX ORDER — SODIUM CHLORIDE 0.9 % (FLUSH) 0.9 %
10 SYRINGE (ML) INJECTION AS NEEDED
Status: DISCONTINUED | OUTPATIENT
Start: 2017-04-14 | End: 2017-04-16

## 2017-04-14 RX ORDER — NALOXONE HCL 0.4 MG/ML
0.1 VIAL (ML) INJECTION
Status: DISCONTINUED | OUTPATIENT
Start: 2017-04-14 | End: 2017-04-16

## 2017-04-14 RX ORDER — HYDROCODONE BITARTRATE AND ACETAMINOPHEN 7.5; 325 MG/1; MG/1
1 TABLET ORAL EVERY 6 HOURS PRN
COMMUNITY
End: 2017-04-14

## 2017-04-14 RX ORDER — ONDANSETRON 2 MG/ML
4 INJECTION INTRAMUSCULAR; INTRAVENOUS EVERY 6 HOURS PRN
Status: DISCONTINUED | OUTPATIENT
Start: 2017-04-14 | End: 2017-04-16

## 2017-04-14 RX ORDER — KETOROLAC TROMETHAMINE 15 MG/ML
15 INJECTION, SOLUTION INTRAMUSCULAR; INTRAVENOUS EVERY 6 HOURS PRN
Status: DISCONTINUED | OUTPATIENT
Start: 2017-04-14 | End: 2017-04-15

## 2017-04-14 RX ADMIN — ONDANSETRON 4 MG: 2 INJECTION INTRAMUSCULAR; INTRAVENOUS at 23:30

## 2017-04-14 RX ADMIN — DEXTROSE AND SODIUM CHLORIDE 100 ML/HR: 5; 450 INJECTION, SOLUTION INTRAVENOUS at 19:09

## 2017-04-14 RX ADMIN — Medication: at 19:08

## 2017-04-14 RX ADMIN — HYDROMORPHONE HYDROCHLORIDE 1 MG: 1 INJECTION, SOLUTION INTRAMUSCULAR; INTRAVENOUS; SUBCUTANEOUS at 18:02

## 2017-04-14 RX ADMIN — HYDROMORPHONE HYDROCHLORIDE 1 MG: 1 INJECTION, SOLUTION INTRAMUSCULAR; INTRAVENOUS; SUBCUTANEOUS at 15:51

## 2017-04-14 RX ADMIN — ONDANSETRON 4 MG: 2 INJECTION INTRAMUSCULAR; INTRAVENOUS at 15:51

## 2017-04-14 RX ADMIN — SODIUM CHLORIDE 1000 ML: 9 INJECTION, SOLUTION INTRAVENOUS at 16:00

## 2017-04-15 PROCEDURE — 94799 UNLISTED PULMONARY SVC/PX: CPT

## 2017-04-15 PROCEDURE — 25010000002 KETOROLAC TROMETHAMINE PER 15 MG: Performed by: UROLOGY

## 2017-04-15 PROCEDURE — G0378 HOSPITAL OBSERVATION PER HR: HCPCS

## 2017-04-15 PROCEDURE — 25010000002 ONDANSETRON PER 1 MG: Performed by: UROLOGY

## 2017-04-15 PROCEDURE — 96376 TX/PRO/DX INJ SAME DRUG ADON: CPT

## 2017-04-15 RX ORDER — KETOROLAC TROMETHAMINE 30 MG/ML
30 INJECTION, SOLUTION INTRAMUSCULAR; INTRAVENOUS EVERY 6 HOURS PRN
Status: DISPENSED | OUTPATIENT
Start: 2017-04-15 | End: 2017-04-15

## 2017-04-15 RX ORDER — KETOROLAC TROMETHAMINE 30 MG/ML
30 INJECTION, SOLUTION INTRAMUSCULAR; INTRAVENOUS EVERY 6 HOURS PRN
Status: DISCONTINUED | OUTPATIENT
Start: 2017-04-15 | End: 2017-04-16

## 2017-04-15 RX ORDER — KETOROLAC TROMETHAMINE 30 MG/ML
30 INJECTION, SOLUTION INTRAMUSCULAR; INTRAVENOUS EVERY 6 HOURS PRN
Status: ACTIVE | OUTPATIENT
Start: 2017-04-15 | End: 2017-04-15

## 2017-04-15 RX ORDER — KETOROLAC TROMETHAMINE 30 MG/ML
30 INJECTION, SOLUTION INTRAMUSCULAR; INTRAVENOUS EVERY 6 HOURS PRN
Status: DISCONTINUED | OUTPATIENT
Start: 2017-04-15 | End: 2017-04-15 | Stop reason: SDUPTHER

## 2017-04-15 RX ADMIN — KETOROLAC TROMETHAMINE 15 MG: 15 INJECTION, SOLUTION INTRAMUSCULAR; INTRAVENOUS at 15:24

## 2017-04-15 RX ADMIN — ONDANSETRON 4 MG: 2 INJECTION INTRAMUSCULAR; INTRAVENOUS at 22:18

## 2017-04-15 RX ADMIN — Medication: at 11:30

## 2017-04-15 RX ADMIN — ONDANSETRON 4 MG: 2 INJECTION INTRAMUSCULAR; INTRAVENOUS at 13:20

## 2017-04-15 RX ADMIN — Medication: at 18:40

## 2017-04-15 RX ADMIN — KETOROLAC TROMETHAMINE 30 MG: 30 INJECTION, SOLUTION INTRAMUSCULAR at 22:32

## 2017-04-15 RX ADMIN — Medication: at 00:37

## 2017-04-15 RX ADMIN — Medication: at 06:34

## 2017-04-15 RX ADMIN — ONDANSETRON 4 MG: 2 INJECTION INTRAMUSCULAR; INTRAVENOUS at 06:38

## 2017-04-15 RX ADMIN — KETOROLAC TROMETHAMINE 15 MG: 15 INJECTION, SOLUTION INTRAMUSCULAR; INTRAVENOUS at 08:44

## 2017-04-16 VITALS
OXYGEN SATURATION: 99 % | SYSTOLIC BLOOD PRESSURE: 135 MMHG | HEART RATE: 74 BPM | BODY MASS INDEX: 34.78 KG/M2 | RESPIRATION RATE: 16 BRPM | TEMPERATURE: 97.9 F | WEIGHT: 256.8 LBS | HEIGHT: 72 IN | DIASTOLIC BLOOD PRESSURE: 95 MMHG

## 2017-04-16 PROBLEM — N20.0 KIDNEY STONE: Status: RESOLVED | Noted: 2017-03-29 | Resolved: 2017-04-16

## 2017-04-16 LAB — BACTERIA SPEC AEROBE CULT: NORMAL

## 2017-04-16 PROCEDURE — G0378 HOSPITAL OBSERVATION PER HR: HCPCS

## 2017-04-16 PROCEDURE — 25010000002 KETOROLAC TROMETHAMINE PER 15 MG: Performed by: UROLOGY

## 2017-04-16 PROCEDURE — 94799 UNLISTED PULMONARY SVC/PX: CPT

## 2017-04-16 RX ORDER — KETOROLAC TROMETHAMINE 10 MG/1
10 TABLET, FILM COATED ORAL EVERY 8 HOURS
Status: DISCONTINUED | OUTPATIENT
Start: 2017-04-16 | End: 2017-04-16 | Stop reason: HOSPADM

## 2017-04-16 RX ORDER — KETOROLAC TROMETHAMINE 10 MG/1
10 TABLET, FILM COATED ORAL EVERY 8 HOURS PRN
Qty: 15 TABLET | Refills: 0 | Status: SHIPPED | OUTPATIENT
Start: 2017-04-16 | End: 2017-04-21

## 2017-04-16 RX ORDER — KETOROLAC TROMETHAMINE 30 MG/ML
30 INJECTION, SOLUTION INTRAMUSCULAR; INTRAVENOUS ONCE AS NEEDED
Status: COMPLETED | OUTPATIENT
Start: 2017-04-16 | End: 2017-04-16

## 2017-04-16 RX ADMIN — MAGNESIUM HYDROXIDE 20 ML: 2400 SUSPENSION ORAL at 09:48

## 2017-04-16 RX ADMIN — KETOROLAC TROMETHAMINE 30 MG: 30 INJECTION, SOLUTION INTRAMUSCULAR at 09:48

## 2017-04-19 LAB
CA PHOS CRY STONE QL IR: 5 %
COD CRY STONE QL IR: 5 %
COLOR STONE: NORMAL
COM CRY STONE QL IR: 90 %
COMPN STONE: NORMAL
Lab: NORMAL
Lab: NORMAL
NIDUS STONE QL: NORMAL
PATH REPORT.COMMENTS IMP SPEC: NORMAL
SIZE STONE: NORMAL MM
WT STONE: 11 MG

## 2021-06-08 ENCOUNTER — HOSPITAL ENCOUNTER (EMERGENCY)
Facility: HOSPITAL | Age: 31
Discharge: HOME | End: 2021-06-08
Attending: EMERGENCY MEDICINE | Admitting: EMERGENCY MEDICINE

## 2021-06-08 ENCOUNTER — APPOINTMENT (OUTPATIENT)
Dept: CT IMAGING | Facility: HOSPITAL | Age: 31
End: 2021-06-08

## 2021-06-08 VITALS
HEIGHT: 72 IN | HEART RATE: 80 BPM | TEMPERATURE: 98.5 F | WEIGHT: 315 LBS | OXYGEN SATURATION: 98 % | BODY MASS INDEX: 42.66 KG/M2 | DIASTOLIC BLOOD PRESSURE: 98 MMHG | RESPIRATION RATE: 18 BRPM | SYSTOLIC BLOOD PRESSURE: 154 MMHG

## 2021-06-08 DIAGNOSIS — R79.89 ABNORMAL LIVER FUNCTION TEST: ICD-10-CM

## 2021-06-08 DIAGNOSIS — N20.0 KIDNEY STONE ON LEFT SIDE: Primary | ICD-10-CM

## 2021-06-08 DIAGNOSIS — D72.829 LEUKOCYTOSIS, UNSPECIFIED TYPE: ICD-10-CM

## 2021-06-08 DIAGNOSIS — N23 RENAL COLIC ON LEFT SIDE: ICD-10-CM

## 2021-06-08 DIAGNOSIS — R73.09 ELEVATED GLUCOSE: ICD-10-CM

## 2021-06-08 LAB
ALBUMIN SERPL-MCNC: 4.4 G/DL (ref 3.5–5.2)
ALBUMIN/GLOB SERPL: 1.4 G/DL
ALP SERPL-CCNC: 138 U/L (ref 39–117)
ALT SERPL W P-5'-P-CCNC: 46 U/L (ref 1–41)
ANION GAP SERPL CALCULATED.3IONS-SCNC: 11 MMOL/L (ref 5–15)
AST SERPL-CCNC: 22 U/L (ref 1–40)
BACTERIA UR QL AUTO: ABNORMAL /HPF
BASOPHILS # BLD AUTO: 0.09 10*3/MM3 (ref 0–0.2)
BASOPHILS NFR BLD AUTO: 0.7 % (ref 0–1.5)
BILIRUB SERPL-MCNC: 0.9 MG/DL (ref 0–1.2)
BILIRUB UR QL STRIP: NEGATIVE
BUN SERPL-MCNC: 15 MG/DL (ref 6–20)
BUN/CREAT SERPL: 17 (ref 7–25)
CALCIUM SPEC-SCNC: 9.7 MG/DL (ref 8.6–10.5)
CHLORIDE SERPL-SCNC: 107 MMOL/L (ref 98–107)
CLARITY UR: CLEAR
CO2 SERPL-SCNC: 25 MMOL/L (ref 22–29)
COLOR UR: YELLOW
CREAT SERPL-MCNC: 0.88 MG/DL (ref 0.76–1.27)
DEPRECATED RDW RBC AUTO: 39.5 FL (ref 37–54)
EOSINOPHIL # BLD AUTO: 0.42 10*3/MM3 (ref 0–0.4)
EOSINOPHIL NFR BLD AUTO: 3.2 % (ref 0.3–6.2)
ERYTHROCYTE [DISTWIDTH] IN BLOOD BY AUTOMATED COUNT: 13.2 % (ref 12.3–15.4)
GFR SERPL CREATININE-BSD FRML MDRD: 103 ML/MIN/1.73
GLOBULIN UR ELPH-MCNC: 3.1 GM/DL
GLUCOSE SERPL-MCNC: 138 MG/DL (ref 65–99)
GLUCOSE UR STRIP-MCNC: NEGATIVE MG/DL
HCT VFR BLD AUTO: 45.1 % (ref 37.5–51)
HGB BLD-MCNC: 14.7 G/DL (ref 13–17.7)
HGB UR QL STRIP.AUTO: ABNORMAL
HOLD SPECIMEN: NORMAL
HYALINE CASTS UR QL AUTO: ABNORMAL /LPF
IMM GRANULOCYTES # BLD AUTO: 0.2 10*3/MM3 (ref 0–0.05)
IMM GRANULOCYTES NFR BLD AUTO: 1.5 % (ref 0–0.5)
KETONES UR QL STRIP: NEGATIVE
LEUKOCYTE ESTERASE UR QL STRIP.AUTO: NEGATIVE
LIPASE SERPL-CCNC: 20 U/L (ref 13–60)
LYMPHOCYTES # BLD AUTO: 4.61 10*3/MM3 (ref 0.7–3.1)
LYMPHOCYTES NFR BLD AUTO: 35.5 % (ref 19.6–45.3)
MCH RBC QN AUTO: 27 PG (ref 26.6–33)
MCHC RBC AUTO-ENTMCNC: 32.6 G/DL (ref 31.5–35.7)
MCV RBC AUTO: 82.9 FL (ref 79–97)
MONOCYTES # BLD AUTO: 0.89 10*3/MM3 (ref 0.1–0.9)
MONOCYTES NFR BLD AUTO: 6.9 % (ref 5–12)
NEUTROPHILS NFR BLD AUTO: 52.2 % (ref 42.7–76)
NEUTROPHILS NFR BLD AUTO: 6.76 10*3/MM3 (ref 1.7–7)
NITRITE UR QL STRIP: NEGATIVE
NRBC BLD AUTO-RTO: 0 /100 WBC (ref 0–0.2)
PH UR STRIP.AUTO: <=5 [PH] (ref 5–8)
PLATELET # BLD AUTO: 249 10*3/MM3 (ref 140–450)
PMV BLD AUTO: 10.5 FL (ref 6–12)
POTASSIUM SERPL-SCNC: 4.1 MMOL/L (ref 3.5–5.2)
PROT SERPL-MCNC: 7.5 G/DL (ref 6–8.5)
PROT UR QL STRIP: ABNORMAL
RBC # BLD AUTO: 5.44 10*6/MM3 (ref 4.14–5.8)
RBC # UR: ABNORMAL /HPF
REF LAB TEST METHOD: ABNORMAL
SODIUM SERPL-SCNC: 143 MMOL/L (ref 136–145)
SP GR UR STRIP: 1.02 (ref 1–1.03)
SQUAMOUS #/AREA URNS HPF: ABNORMAL /HPF
UROBILINOGEN UR QL STRIP: ABNORMAL
WBC # BLD AUTO: 12.97 10*3/MM3 (ref 3.4–10.8)
WBC UR QL AUTO: ABNORMAL /HPF
WHOLE BLOOD HOLD SPECIMEN: NORMAL

## 2021-06-08 PROCEDURE — 96361 HYDRATE IV INFUSION ADD-ON: CPT

## 2021-06-08 PROCEDURE — 80053 COMPREHEN METABOLIC PANEL: CPT | Performed by: EMERGENCY MEDICINE

## 2021-06-08 PROCEDURE — 25010000002 ONDANSETRON PER 1 MG: Performed by: EMERGENCY MEDICINE

## 2021-06-08 PROCEDURE — 83690 ASSAY OF LIPASE: CPT | Performed by: EMERGENCY MEDICINE

## 2021-06-08 PROCEDURE — 74176 CT ABD & PELVIS W/O CONTRAST: CPT

## 2021-06-08 PROCEDURE — 96375 TX/PRO/DX INJ NEW DRUG ADDON: CPT

## 2021-06-08 PROCEDURE — 96374 THER/PROPH/DIAG INJ IV PUSH: CPT

## 2021-06-08 PROCEDURE — 25010000002 HYDROMORPHONE PER 4 MG: Performed by: EMERGENCY MEDICINE

## 2021-06-08 PROCEDURE — 96376 TX/PRO/DX INJ SAME DRUG ADON: CPT

## 2021-06-08 PROCEDURE — 85025 COMPLETE CBC W/AUTO DIFF WBC: CPT | Performed by: EMERGENCY MEDICINE

## 2021-06-08 PROCEDURE — 99283 EMERGENCY DEPT VISIT LOW MDM: CPT

## 2021-06-08 PROCEDURE — 81001 URINALYSIS AUTO W/SCOPE: CPT | Performed by: EMERGENCY MEDICINE

## 2021-06-08 PROCEDURE — 25010000002 KETOROLAC TROMETHAMINE PER 15 MG: Performed by: EMERGENCY MEDICINE

## 2021-06-08 PROCEDURE — 25010000002 DEXAMETHASONE PER 1 MG: Performed by: EMERGENCY MEDICINE

## 2021-06-08 RX ORDER — DEXAMETHASONE SODIUM PHOSPHATE 4 MG/ML
8 INJECTION, SOLUTION INTRA-ARTICULAR; INTRALESIONAL; INTRAMUSCULAR; INTRAVENOUS; SOFT TISSUE ONCE
Status: COMPLETED | OUTPATIENT
Start: 2021-06-08 | End: 2021-06-08

## 2021-06-08 RX ORDER — TAMSULOSIN HYDROCHLORIDE 0.4 MG/1
1 CAPSULE ORAL NIGHTLY
Qty: 15 CAPSULE | Refills: 0 | Status: SHIPPED | OUTPATIENT
Start: 2021-06-08

## 2021-06-08 RX ORDER — HYDROMORPHONE HYDROCHLORIDE 1 MG/ML
0.25 INJECTION, SOLUTION INTRAMUSCULAR; INTRAVENOUS; SUBCUTANEOUS ONCE
Status: COMPLETED | OUTPATIENT
Start: 2021-06-08 | End: 2021-06-08

## 2021-06-08 RX ORDER — HYDROMORPHONE HYDROCHLORIDE 1 MG/ML
0.5 INJECTION, SOLUTION INTRAMUSCULAR; INTRAVENOUS; SUBCUTANEOUS
Status: COMPLETED | OUTPATIENT
Start: 2021-06-08 | End: 2021-06-08

## 2021-06-08 RX ORDER — HYDROMORPHONE HYDROCHLORIDE 2 MG/1
4 TABLET ORAL EVERY 6 HOURS PRN
Qty: 12 TABLET | Refills: 0 | Status: SHIPPED | OUTPATIENT
Start: 2021-06-08 | End: 2021-06-11 | Stop reason: HOSPADM

## 2021-06-08 RX ORDER — ONDANSETRON 2 MG/ML
4 INJECTION INTRAMUSCULAR; INTRAVENOUS ONCE
Status: COMPLETED | OUTPATIENT
Start: 2021-06-08 | End: 2021-06-08

## 2021-06-08 RX ORDER — SODIUM CHLORIDE 9 MG/ML
10 INJECTION INTRAVENOUS AS NEEDED
Status: DISCONTINUED | OUTPATIENT
Start: 2021-06-08 | End: 2021-06-08 | Stop reason: HOSPADM

## 2021-06-08 RX ORDER — KETOROLAC TROMETHAMINE 15 MG/ML
15 INJECTION, SOLUTION INTRAMUSCULAR; INTRAVENOUS ONCE
Status: COMPLETED | OUTPATIENT
Start: 2021-06-08 | End: 2021-06-08

## 2021-06-08 RX ORDER — HYDROMORPHONE HYDROCHLORIDE 1 MG/ML
0.5 INJECTION, SOLUTION INTRAMUSCULAR; INTRAVENOUS; SUBCUTANEOUS ONCE
Status: COMPLETED | OUTPATIENT
Start: 2021-06-08 | End: 2021-06-08

## 2021-06-08 RX ORDER — ONDANSETRON 4 MG/1
4 TABLET, ORALLY DISINTEGRATING ORAL EVERY 8 HOURS PRN
Qty: 15 TABLET | Refills: 0 | Status: SHIPPED | OUTPATIENT
Start: 2021-06-08

## 2021-06-08 RX ORDER — NAPROXEN 250 MG/1
250 TABLET ORAL 2 TIMES DAILY WITH MEALS
Qty: 24 TABLET | Refills: 0 | Status: SHIPPED | OUTPATIENT
Start: 2021-06-08

## 2021-06-08 RX ORDER — SODIUM CHLORIDE 9 MG/ML
125 INJECTION, SOLUTION INTRAVENOUS CONTINUOUS
Status: DISCONTINUED | OUTPATIENT
Start: 2021-06-08 | End: 2021-06-08 | Stop reason: HOSPADM

## 2021-06-08 RX ADMIN — HYDROMORPHONE HYDROCHLORIDE 0.5 MG: 1 INJECTION, SOLUTION INTRAMUSCULAR; INTRAVENOUS; SUBCUTANEOUS at 08:11

## 2021-06-08 RX ADMIN — HYDROMORPHONE HYDROCHLORIDE 0.5 MG: 1 INJECTION, SOLUTION INTRAMUSCULAR; INTRAVENOUS; SUBCUTANEOUS at 07:22

## 2021-06-08 RX ADMIN — KETOROLAC TROMETHAMINE 15 MG: 15 INJECTION, SOLUTION INTRAMUSCULAR; INTRAVENOUS at 07:21

## 2021-06-08 RX ADMIN — DEXAMETHASONE SODIUM PHOSPHATE 8 MG: 4 INJECTION, SOLUTION INTRA-ARTICULAR; INTRALESIONAL; INTRAMUSCULAR; INTRAVENOUS; SOFT TISSUE at 09:53

## 2021-06-08 RX ADMIN — ONDANSETRON 4 MG: 2 INJECTION INTRAMUSCULAR; INTRAVENOUS at 07:19

## 2021-06-08 RX ADMIN — HYDROMORPHONE HYDROCHLORIDE 0.25 MG: 1 INJECTION, SOLUTION INTRAMUSCULAR; INTRAVENOUS; SUBCUTANEOUS at 09:55

## 2021-06-08 RX ADMIN — SODIUM CHLORIDE 1000 ML: 9 INJECTION, SOLUTION INTRAVENOUS at 07:24

## 2021-06-08 RX ADMIN — HYDROMORPHONE HYDROCHLORIDE 0.5 MG: 1 INJECTION, SOLUTION INTRAMUSCULAR; INTRAVENOUS; SUBCUTANEOUS at 08:33

## 2021-06-08 RX ADMIN — HYDROMORPHONE HYDROCHLORIDE 0.5 MG: 1 INJECTION, SOLUTION INTRAMUSCULAR; INTRAVENOUS; SUBCUTANEOUS at 08:59

## 2021-06-08 RX ADMIN — KETOROLAC TROMETHAMINE 15 MG: 15 INJECTION, SOLUTION INTRAMUSCULAR; INTRAVENOUS at 08:57

## 2021-06-08 RX ADMIN — HYDROMORPHONE HYDROCHLORIDE 0.5 MG: 1 INJECTION, SOLUTION INTRAMUSCULAR; INTRAVENOUS; SUBCUTANEOUS at 07:41

## 2021-06-08 RX ADMIN — SODIUM CHLORIDE 125 ML/HR: 9 INJECTION, SOLUTION INTRAVENOUS at 09:00

## 2021-06-08 NOTE — ED PROVIDER NOTES
Subjective   This is a pleasant 30-year-old male who comes the emergency room today with insidious onset of sharp left flank pain that began about 24 hours ago.  The pain was intermittent is gotten much worse this morning.  He radiates a bit toward his abdomen.  Is been associated with nausea and vomiting and very reminiscent of kidney stone problems that he had in 2017 when he was seen here and had to have stent placement by Dr. Lomas.  He has not had stone issues since that time that he knows of.      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  He has had Covid in the past but has not had the vaccine.  He takes no regular medications.  He has no family history of kidney stones that he knows of.    He is not been sick with fever, chills, runny nose, sore throat, or cough.  He has been sweating however.  He tells me bowel movements and urine have been at the baseline he is not passed blood per any orifice.        All other systems reviewed and are negative except as noted above.          Review of Systems   All other systems reviewed and are negative.      Past Medical History:   Diagnosis Date   • Kidney stone    • Renal disorder        Allergies   Allergen Reactions   • Shellfish-Derived Products Anaphylaxis       Past Surgical History:   Procedure Laterality Date   • CYSTOSCOPY URETEROSCOPY STONE MANIPULATION/EXTRACTION Left 3/29/2017    Procedure: LEFT URETEROSCOPY, LEFT URETERAL DILATION, STENT PLACEMENT;  Surgeon: Niles Lomas MD;  Location:  [facility];  Service:    • CYSTOSCOPY URETEROSCOPY STONE MANIPULATION/EXTRACTION Left 4/12/2017    Procedure: LEFT CYSTOSCOPY URETEROSCOPY STONE MANIPULATION/EXTRACTION, LASER LITHOTRIPSY LEFT URETERAL DILATION, REMOVAL OF STENT, PLACEMENT JJ STENT 6X26;  Surgeon: Niles Lomas MD;  Location:  [facility];  Service:    • THROAT SURGERY      benign cyst removed at age 5.       History reviewed. No pertinent family history.    Social History      Socioeconomic History   • Marital status: Single     Spouse name: Not on file   • Number of children: Not on file   • Years of education: Not on file   • Highest education level: Not on file   Tobacco Use   • Smoking status: Never Smoker   • Smokeless tobacco: Former User   Substance and Sexual Activity   • Alcohol use: Yes     Alcohol/week: 3.0 standard drinks     Types: 3 Cans of beer per week     Comment: pt drinks once a week   • Drug use: No   • Sexual activity: Defer           Objective   Physical Exam  Vitals and nursing note reviewed.   Constitutional:       Comments: This is a 30-year-old looks like he does not feel well he is diaphoretic and rocking on the ED gurney   HENT:      Head: Normocephalic and atraumatic.      Right Ear: External ear normal.      Left Ear: External ear normal.      Nose: Nose normal.      Mouth/Throat:      Mouth: Mucous membranes are moist.      Pharynx: Oropharynx is clear.   Eyes:      Extraocular Movements: Extraocular movements intact.      Conjunctiva/sclera: Conjunctivae normal.      Pupils: Pupils are equal, round, and reactive to light.   Cardiovascular:      Rate and Rhythm: Normal rate and regular rhythm.      Pulses: Normal pulses.      Heart sounds: Normal heart sounds.   Pulmonary:      Effort: Pulmonary effort is normal.      Breath sounds: Normal breath sounds.   Abdominal:      Comments: Large girth BMI 44 soft and nontender without organomegaly, masses, or guarding.  His flanks are examined and are nontender I do not see mass or rash.   Musculoskeletal:         General: No swelling, tenderness or deformity. Normal range of motion.      Cervical back: Normal range of motion and neck supple.   Skin:     General: Skin is warm.      Capillary Refill: Capillary refill takes less than 2 seconds.   Neurological:      General: No focal deficit present.      Mental Status: He is oriented to person, place, and time.      Cranial Nerves: No cranial nerve deficit.       Sensory: No sensory deficit.      Motor: No weakness.   Psychiatric:         Mood and Affect: Mood normal.         Procedures           ED Course           Recent Results (from the past 24 hour(s))   Comprehensive Metabolic Panel    Collection Time: 06/08/21  6:53 AM    Specimen: Blood   Result Value Ref Range    Glucose 138 (H) 65 - 99 mg/dL    BUN 15 6 - 20 mg/dL    Creatinine 0.88 0.76 - 1.27 mg/dL    Sodium 143 136 - 145 mmol/L    Potassium 4.1 3.5 - 5.2 mmol/L    Chloride 107 98 - 107 mmol/L    CO2 25.0 22.0 - 29.0 mmol/L    Calcium 9.7 8.6 - 10.5 mg/dL    Total Protein 7.5 6.0 - 8.5 g/dL    Albumin 4.40 3.50 - 5.20 g/dL    ALT (SGPT) 46 (H) 1 - 41 U/L    AST (SGOT) 22 1 - 40 U/L    Alkaline Phosphatase 138 (H) 39 - 117 U/L    Total Bilirubin 0.9 0.0 - 1.2 mg/dL    eGFR Non African Amer 103 >60 mL/min/1.73    Globulin 3.1 gm/dL    A/G Ratio 1.4 g/dL    BUN/Creatinine Ratio 17.0 7.0 - 25.0    Anion Gap 11.0 5.0 - 15.0 mmol/L   Lipase    Collection Time: 06/08/21  6:53 AM    Specimen: Blood   Result Value Ref Range    Lipase 20 13 - 60 U/L   Green Top (Gel)    Collection Time: 06/08/21  6:53 AM   Result Value Ref Range    Extra Tube Hold for add-ons.    Lavender Top    Collection Time: 06/08/21  6:53 AM   Result Value Ref Range    Extra Tube hold for add-on    Gold Top - SST    Collection Time: 06/08/21  6:53 AM   Result Value Ref Range    Extra Tube Hold for add-ons.    Gray Top    Collection Time: 06/08/21  6:53 AM   Result Value Ref Range    Extra Tube Hold for add-ons.    CBC Auto Differential    Collection Time: 06/08/21  6:53 AM    Specimen: Blood   Result Value Ref Range    WBC 12.97 (H) 3.40 - 10.80 10*3/mm3    RBC 5.44 4.14 - 5.80 10*6/mm3    Hemoglobin 14.7 13.0 - 17.7 g/dL    Hematocrit 45.1 37.5 - 51.0 %    MCV 82.9 79.0 - 97.0 fL    MCH 27.0 26.6 - 33.0 pg    MCHC 32.6 31.5 - 35.7 g/dL    RDW 13.2 12.3 - 15.4 %    RDW-SD 39.5 37.0 - 54.0 fl    MPV 10.5 6.0 - 12.0 fL    Platelets 249 140 - 450  10*3/mm3    Neutrophil % 52.2 42.7 - 76.0 %    Lymphocyte % 35.5 19.6 - 45.3 %    Monocyte % 6.9 5.0 - 12.0 %    Eosinophil % 3.2 0.3 - 6.2 %    Basophil % 0.7 0.0 - 1.5 %    Immature Grans % 1.5 (H) 0.0 - 0.5 %    Neutrophils, Absolute 6.76 1.70 - 7.00 10*3/mm3    Lymphocytes, Absolute 4.61 (H) 0.70 - 3.10 10*3/mm3    Monocytes, Absolute 0.89 0.10 - 0.90 10*3/mm3    Eosinophils, Absolute 0.42 (H) 0.00 - 0.40 10*3/mm3    Basophils, Absolute 0.09 0.00 - 0.20 10*3/mm3    Immature Grans, Absolute 0.20 (H) 0.00 - 0.05 10*3/mm3    nRBC 0.0 0.0 - 0.2 /100 WBC   Urinalysis With Microscopic If Indicated (No Culture) - Urine, Clean Catch    Collection Time: 06/08/21  6:56 AM    Specimen: Urine, Clean Catch   Result Value Ref Range    Color, UA Yellow Yellow, Straw    Appearance, UA Clear Clear    pH, UA <=5.0 5.0 - 8.0    Specific Gravity, UA 1.025 1.001 - 1.030    Glucose, UA Negative Negative    Ketones, UA Negative Negative    Bilirubin, UA Negative Negative    Blood, UA Large (3+) (A) Negative    Protein, UA Trace (A) Negative    Leuk Esterase, UA Negative Negative    Nitrite, UA Negative Negative    Urobilinogen, UA 0.2 E.U./dL 0.2 - 1.0 E.U./dL   Urinalysis, Microscopic Only - Urine, Clean Catch    Collection Time: 06/08/21  6:56 AM    Specimen: Urine, Clean Catch   Result Value Ref Range    RBC, UA 13-20 (A) None Seen, 0-2 /HPF    WBC, UA 6-12 (A) None Seen, 0-2 /HPF    Bacteria, UA None Seen None Seen, Trace /HPF    Squamous Epithelial Cells, UA 0-2 None Seen, 0-2 /HPF    Hyaline Casts, UA 0-6 0 - 6 /LPF    Methodology Automated Microscopy      Note: In addition to lab results from this visit, the labs listed above may include labs taken at another facility or during a different encounter within the last 24 hours. Please correlate lab times with ED admission and discharge times for further clarification of the services performed during this visit.    CT Abdomen Pelvis Without Contrast   Final Result   4 mm  obstructing stone at the proximal left ureter with mild   associated obstructive nephropathy.           This report was finalized on 6/8/2021 8:39 AM by Drew Mast.            Vitals:    06/08/21 0830 06/08/21 0844 06/08/21 0853 06/08/21 0953   BP: 154/98      BP Location:       Patient Position:       Pulse:   71 80   Resp:       Temp:       TempSrc:       SpO2: 99% 98%  98%   Weight:       Height:         Medications   sodium chloride 0.9 % bolus 1,000 mL (0 mL Intravenous Stopped 6/8/21 0832)   HYDROmorphone (DILAUDID) injection 0.5 mg (0.5 mg Intravenous Given 6/8/21 0833)   ketorolac (TORADOL) injection 15 mg (15 mg Intravenous Given 6/8/21 0721)   ondansetron (ZOFRAN) injection 4 mg (4 mg Intravenous Given 6/8/21 0719)   ketorolac (TORADOL) injection 15 mg (15 mg Intravenous Given 6/8/21 0857)   HYDROmorphone (DILAUDID) injection 0.5 mg (0.5 mg Intravenous Given 6/8/21 0859)   dexamethasone (DECADRON) injection 8 mg (8 mg Intravenous Given 6/8/21 0953)   HYDROmorphone (DILAUDID) injection 0.25 mg (0.25 mg Intravenous Given 6/8/21 0955)     ECG/EMG Results (last 24 hours)     ** No results found for the last 24 hours. **        No orders to display                                     MDM  Number of Diagnoses or Management Options  Abnormal liver function test  Elevated glucose  Kidney stone on left side  Leukocytosis, unspecified type  Renal colic on left side  Diagnosis management comments:       I reviewed all available studies with the patient and his significant other is now at the bedside.    His CT scan shows a proximal left UPJ stone I think the cause of his problem.  His labs showed a mild leukocytosis but his renal function is normal he has a few white blood cells and 2 years count red blood cells in his urine.    On recheck after multiple doses of pain medication the patient is resting comfortably.    With his last kidney stone in 2017 apparently had a tough course and could not pass a stone due  to what sounds like perhaps was stenosis in his ureter.  He required a couple ER admissions and hospital visits then.  He would like to not have to repeat that.    I spoke Dr. Whitt, on-call urology who reviewed the patient's study but right now does not feel he needs admission will see him this Thursday in office to arrange stent placement and lithotripsy or other advanced interventions if needed.    We will treat the patient with oral Dilaudid to see if this helps control his pain along with naproxen and Zofran and Flomax.    Patient return to the ER if worse in any way.    Are agreeable with the plan       Amount and/or Complexity of Data Reviewed  Clinical lab tests: reviewed  Tests in the radiology section of CPT®: reviewed        Final diagnoses:   Kidney stone on left side   Renal colic on left side   Leukocytosis, unspecified type   Elevated glucose   Abnormal liver function test       ED Disposition  ED Disposition     ED Disposition Condition Comment    Discharge Stable           PATIENT CONNECTION - [place] [address]  [phone]  Schedule an appointment as soon as possible for a visit   If you    Hipolito Whitt MD  [address] [facility] [address]  [place] [address]  [phone]    Schedule an appointment as soon as possible for a visit   Call the office today and tell them Dr. Tompkins wants to see you on Thursday and they should give you an appointment time         Medication List      New Prescriptions    HYDROmorphone 2 MG tablet  Commonly known as: DILAUDID  Take 2 tablets by mouth Every 6 (Six) Hours As Needed for Moderate Pain .     naproxen 250 MG tablet  Commonly known as: NAPROSYN  Take 1 tablet by mouth 2 (Two) Times a Day With Meals.     ondansetron ODT 4 MG disintegrating tablet  Commonly known as: ZOFRAN-ODT  Place 1 tablet on the tongue Every 8 (Eight) Hours As Needed for Nausea.     tamsulosin 0.4 MG capsule 24 hr capsule  Commonly known as: FLOMAX  Take 1 capsule  by mouth Every Night. Until pain is gone or the kidney stone passes        Stop    oxyCODONE-acetaminophen 7.5-325 MG per tablet  Commonly known as: PERCOCET           Where to Get Your Medications      These medications were sent to [facility] [address] [place] [address] - [address] AT [facility] [address] - [phone] PH - [phone] FX  [address] [place] [address], [place] [id number]    Phone: [phone]   · HYDROmorphone 2 MG tablet  · naproxen 250 MG tablet  · ondansetron ODT 4 MG disintegrating tablet  · tamsulosin 0.4 MG capsule 24 hr capsule          Jhonatan Street MD  06/08/21 1402

## 2021-06-09 ENCOUNTER — HOSPITAL ENCOUNTER (INPATIENT)
Facility: HOSPITAL | Age: 31
LOS: 2 days | Discharge: HOME | End: 2021-06-11
Attending: EMERGENCY MEDICINE | Admitting: INTERNAL MEDICINE

## 2021-06-09 DIAGNOSIS — N28.9 ACUTE RENAL INSUFFICIENCY: ICD-10-CM

## 2021-06-09 DIAGNOSIS — N20.0 KIDNEY STONE ON LEFT SIDE: Primary | ICD-10-CM

## 2021-06-09 DIAGNOSIS — R52 INTRACTABLE PAIN: ICD-10-CM

## 2021-06-09 PROBLEM — N39.0 UTI (URINARY TRACT INFECTION): Status: ACTIVE | Noted: 2021-06-09

## 2021-06-09 PROBLEM — R79.89 ELEVATED SERUM CREATININE: Status: ACTIVE | Noted: 2021-06-09

## 2021-06-09 LAB
ALBUMIN SERPL-MCNC: 4 G/DL (ref 3.5–5.2)
ALBUMIN/GLOB SERPL: 1.6 G/DL
ALP SERPL-CCNC: 111 U/L (ref 39–117)
ALT SERPL W P-5'-P-CCNC: 41 U/L (ref 1–41)
ANION GAP SERPL CALCULATED.3IONS-SCNC: 12 MMOL/L (ref 5–15)
AST SERPL-CCNC: 21 U/L (ref 1–40)
BACTERIA UR QL AUTO: ABNORMAL /HPF
BASOPHILS # BLD AUTO: 0.06 10*3/MM3 (ref 0–0.2)
BASOPHILS NFR BLD AUTO: 0.3 % (ref 0–1.5)
BILIRUB SERPL-MCNC: 0.6 MG/DL (ref 0–1.2)
BILIRUB UR QL STRIP: NEGATIVE
BUN SERPL-MCNC: 22 MG/DL (ref 6–20)
BUN/CREAT SERPL: 16.9 (ref 7–25)
CALCIUM SPEC-SCNC: 9.2 MG/DL (ref 8.6–10.5)
CHLORIDE SERPL-SCNC: 103 MMOL/L (ref 98–107)
CLARITY UR: ABNORMAL
CO2 SERPL-SCNC: 25 MMOL/L (ref 22–29)
COD CRY URNS QL: ABNORMAL /HPF
COLOR UR: YELLOW
CREAT SERPL-MCNC: 1.3 MG/DL (ref 0.76–1.27)
DEPRECATED RDW RBC AUTO: 40.4 FL (ref 37–54)
EOSINOPHIL # BLD AUTO: 0.06 10*3/MM3 (ref 0–0.4)
EOSINOPHIL NFR BLD AUTO: 0.3 % (ref 0.3–6.2)
ERYTHROCYTE [DISTWIDTH] IN BLOOD BY AUTOMATED COUNT: 13.2 % (ref 12.3–15.4)
FLUAV RNA RESP QL NAA+PROBE: NOT DETECTED
FLUBV RNA RESP QL NAA+PROBE: NOT DETECTED
GFR SERPL CREATININE-BSD FRML MDRD: 66 ML/MIN/1.73
GLOBULIN UR ELPH-MCNC: 2.5 GM/DL
GLUCOSE SERPL-MCNC: 99 MG/DL (ref 65–99)
GLUCOSE UR STRIP-MCNC: ABNORMAL MG/DL
HCT VFR BLD AUTO: 44.6 % (ref 37.5–51)
HGB BLD-MCNC: 14.3 G/DL (ref 13–17.7)
HGB UR QL STRIP.AUTO: NEGATIVE
HOLD SPECIMEN: NORMAL
HYALINE CASTS UR QL AUTO: ABNORMAL /LPF
IMM GRANULOCYTES # BLD AUTO: 0.13 10*3/MM3 (ref 0–0.05)
IMM GRANULOCYTES NFR BLD AUTO: 0.7 % (ref 0–0.5)
KETONES UR QL STRIP: NEGATIVE
LEUKOCYTE ESTERASE UR QL STRIP.AUTO: ABNORMAL
LIPASE SERPL-CCNC: 19 U/L (ref 13–60)
LYMPHOCYTES # BLD AUTO: 3.86 10*3/MM3 (ref 0.7–3.1)
LYMPHOCYTES NFR BLD AUTO: 20.5 % (ref 19.6–45.3)
MCH RBC QN AUTO: 26.9 PG (ref 26.6–33)
MCHC RBC AUTO-ENTMCNC: 32.1 G/DL (ref 31.5–35.7)
MCV RBC AUTO: 83.8 FL (ref 79–97)
MONOCYTES # BLD AUTO: 1.4 10*3/MM3 (ref 0.1–0.9)
MONOCYTES NFR BLD AUTO: 7.4 % (ref 5–12)
NEUTROPHILS NFR BLD AUTO: 13.35 10*3/MM3 (ref 1.7–7)
NEUTROPHILS NFR BLD AUTO: 70.8 % (ref 42.7–76)
NITRITE UR QL STRIP: NEGATIVE
NRBC BLD AUTO-RTO: 0 /100 WBC (ref 0–0.2)
PH UR STRIP.AUTO: 5.5 [PH] (ref 5–8)
PLATELET # BLD AUTO: 245 10*3/MM3 (ref 140–450)
PMV BLD AUTO: 10.7 FL (ref 6–12)
POTASSIUM SERPL-SCNC: 4.1 MMOL/L (ref 3.5–5.2)
PROT SERPL-MCNC: 6.5 G/DL (ref 6–8.5)
PROT UR QL STRIP: ABNORMAL
RBC # BLD AUTO: 5.32 10*6/MM3 (ref 4.14–5.8)
RBC # UR: ABNORMAL /HPF
REF LAB TEST METHOD: ABNORMAL
SARS-COV-2 RNA RESP QL NAA+PROBE: NOT DETECTED
SODIUM SERPL-SCNC: 140 MMOL/L (ref 136–145)
SP GR UR STRIP: 1.04 (ref 1–1.03)
SQUAMOUS #/AREA URNS HPF: ABNORMAL /HPF
UROBILINOGEN UR QL STRIP: ABNORMAL
WBC # BLD AUTO: 18.86 10*3/MM3 (ref 3.4–10.8)
WBC UR QL AUTO: ABNORMAL /HPF
WHOLE BLOOD HOLD SPECIMEN: NORMAL

## 2021-06-09 PROCEDURE — 83605 ASSAY OF LACTIC ACID: CPT | Performed by: PHYSICIAN ASSISTANT

## 2021-06-09 PROCEDURE — 25010000002 KETOROLAC TROMETHAMINE PER 15 MG: Performed by: EMERGENCY MEDICINE

## 2021-06-09 PROCEDURE — 99284 EMERGENCY DEPT VISIT MOD MDM: CPT

## 2021-06-09 PROCEDURE — 99221 1ST HOSP IP/OBS SF/LOW 40: CPT | Performed by: INTERNAL MEDICINE

## 2021-06-09 PROCEDURE — 87636 SARSCOV2 & INF A&B AMP PRB: CPT | Performed by: EMERGENCY MEDICINE

## 2021-06-09 PROCEDURE — 25010000002 MORPHINE PER 10 MG: Performed by: EMERGENCY MEDICINE

## 2021-06-09 PROCEDURE — 87086 URINE CULTURE/COLONY COUNT: CPT | Performed by: PHYSICIAN ASSISTANT

## 2021-06-09 PROCEDURE — 83690 ASSAY OF LIPASE: CPT

## 2021-06-09 PROCEDURE — 85025 COMPLETE CBC W/AUTO DIFF WBC: CPT

## 2021-06-09 PROCEDURE — 87040 BLOOD CULTURE FOR BACTERIA: CPT | Performed by: PHYSICIAN ASSISTANT

## 2021-06-09 PROCEDURE — 80053 COMPREHEN METABOLIC PANEL: CPT

## 2021-06-09 PROCEDURE — 81001 URINALYSIS AUTO W/SCOPE: CPT

## 2021-06-09 RX ORDER — HYDROCODONE BITARTRATE AND ACETAMINOPHEN 5; 325 MG/1; MG/1
1 TABLET ORAL EVERY 6 HOURS PRN
Status: DISCONTINUED | OUTPATIENT
Start: 2021-06-09 | End: 2021-06-11 | Stop reason: HOSPADM

## 2021-06-09 RX ORDER — ONDANSETRON 2 MG/ML
4 INJECTION INTRAMUSCULAR; INTRAVENOUS EVERY 6 HOURS PRN
Status: DISCONTINUED | OUTPATIENT
Start: 2021-06-09 | End: 2021-06-11 | Stop reason: HOSPADM

## 2021-06-09 RX ORDER — SODIUM CHLORIDE 9 MG/ML
100 INJECTION, SOLUTION INTRAVENOUS CONTINUOUS
Status: DISCONTINUED | OUTPATIENT
Start: 2021-06-10 | End: 2021-06-10

## 2021-06-09 RX ORDER — MORPHINE SULFATE 4 MG/ML
4 INJECTION, SOLUTION INTRAMUSCULAR; INTRAVENOUS ONCE
Status: COMPLETED | OUTPATIENT
Start: 2021-06-09 | End: 2021-06-09

## 2021-06-09 RX ORDER — SODIUM CHLORIDE 9 MG/ML
10 INJECTION INTRAVENOUS AS NEEDED
Status: DISCONTINUED | OUTPATIENT
Start: 2021-06-09 | End: 2021-06-11 | Stop reason: HOSPADM

## 2021-06-09 RX ORDER — MORPHINE SULFATE 2 MG/ML
1 INJECTION, SOLUTION INTRAMUSCULAR; INTRAVENOUS EVERY 4 HOURS PRN
Status: DISCONTINUED | OUTPATIENT
Start: 2021-06-09 | End: 2021-06-10

## 2021-06-09 RX ORDER — ONDANSETRON 4 MG/1
4 TABLET, FILM COATED ORAL EVERY 6 HOURS PRN
Status: DISCONTINUED | OUTPATIENT
Start: 2021-06-09 | End: 2021-06-11 | Stop reason: HOSPADM

## 2021-06-09 RX ORDER — SODIUM CHLORIDE 0.9 % (FLUSH) 0.9 %
10 SYRINGE (ML) INJECTION EVERY 12 HOURS SCHEDULED
Status: DISCONTINUED | OUTPATIENT
Start: 2021-06-10 | End: 2021-06-11 | Stop reason: HOSPADM

## 2021-06-09 RX ORDER — ACETAMINOPHEN 650 MG/1
650 SUPPOSITORY RECTAL EVERY 4 HOURS PRN
Status: DISCONTINUED | OUTPATIENT
Start: 2021-06-09 | End: 2021-06-11 | Stop reason: HOSPADM

## 2021-06-09 RX ORDER — SODIUM CHLORIDE 0.9 % (FLUSH) 0.9 %
10 SYRINGE (ML) INJECTION AS NEEDED
Status: DISCONTINUED | OUTPATIENT
Start: 2021-06-09 | End: 2021-06-11 | Stop reason: HOSPADM

## 2021-06-09 RX ORDER — ACETAMINOPHEN 160 MG/5ML
650 SOLUTION ORAL EVERY 4 HOURS PRN
Status: DISCONTINUED | OUTPATIENT
Start: 2021-06-09 | End: 2021-06-11 | Stop reason: HOSPADM

## 2021-06-09 RX ORDER — TAMSULOSIN HYDROCHLORIDE 0.4 MG/1
0.4 CAPSULE ORAL NIGHTLY
Status: DISCONTINUED | OUTPATIENT
Start: 2021-06-10 | End: 2021-06-11 | Stop reason: HOSPADM

## 2021-06-09 RX ORDER — ACETAMINOPHEN 325 MG/1
650 TABLET ORAL EVERY 4 HOURS PRN
Status: DISCONTINUED | OUTPATIENT
Start: 2021-06-09 | End: 2021-06-11 | Stop reason: HOSPADM

## 2021-06-09 RX ORDER — CHOLECALCIFEROL (VITAMIN D3) 125 MCG
5 CAPSULE ORAL NIGHTLY PRN
Status: DISCONTINUED | OUTPATIENT
Start: 2021-06-09 | End: 2021-06-11 | Stop reason: HOSPADM

## 2021-06-09 RX ORDER — KETOROLAC TROMETHAMINE 15 MG/ML
15 INJECTION, SOLUTION INTRAMUSCULAR; INTRAVENOUS ONCE
Status: COMPLETED | OUTPATIENT
Start: 2021-06-09 | End: 2021-06-09

## 2021-06-09 RX ADMIN — SODIUM CHLORIDE 100 ML/HR: 9 INJECTION, SOLUTION INTRAVENOUS at 23:40

## 2021-06-09 RX ADMIN — SODIUM CHLORIDE 1 G: 900 INJECTION INTRAVENOUS at 23:40

## 2021-06-09 RX ADMIN — KETOROLAC TROMETHAMINE 15 MG: 15 INJECTION, SOLUTION INTRAMUSCULAR; INTRAVENOUS at 22:09

## 2021-06-09 RX ADMIN — MORPHINE SULFATE 4 MG: 4 INJECTION, SOLUTION INTRAMUSCULAR; INTRAVENOUS at 22:09

## 2021-06-09 RX ADMIN — TAMSULOSIN HYDROCHLORIDE 0.4 MG: 0.4 CAPSULE ORAL at 23:38

## 2021-06-09 RX ADMIN — SODIUM CHLORIDE 1000 ML: 9 INJECTION, SOLUTION INTRAVENOUS at 22:09

## 2021-06-10 ENCOUNTER — APPOINTMENT (OUTPATIENT)
Dept: GENERAL RADIOLOGY | Facility: HOSPITAL | Age: 31
End: 2021-06-10

## 2021-06-10 ENCOUNTER — ANESTHESIA EVENT (OUTPATIENT)
Dept: PERIOP | Facility: HOSPITAL | Age: 31
End: 2021-06-10

## 2021-06-10 ENCOUNTER — ANESTHESIA (OUTPATIENT)
Dept: PERIOP | Facility: HOSPITAL | Age: 31
End: 2021-06-10

## 2021-06-10 LAB
ANION GAP SERPL CALCULATED.3IONS-SCNC: 10 MMOL/L (ref 5–15)
BUN SERPL-MCNC: 22 MG/DL (ref 6–20)
BUN/CREAT SERPL: 22.2 (ref 7–25)
CALCIUM SPEC-SCNC: 8.3 MG/DL (ref 8.6–10.5)
CHLORIDE SERPL-SCNC: 108 MMOL/L (ref 98–107)
CO2 SERPL-SCNC: 24 MMOL/L (ref 22–29)
CREAT SERPL-MCNC: 0.99 MG/DL (ref 0.76–1.27)
D-LACTATE SERPL-SCNC: 0.8 MMOL/L (ref 0.5–2)
DEPRECATED RDW RBC AUTO: 42.1 FL (ref 37–54)
ERYTHROCYTE [DISTWIDTH] IN BLOOD BY AUTOMATED COUNT: 13.4 % (ref 12.3–15.4)
GFR SERPL CREATININE-BSD FRML MDRD: 90 ML/MIN/1.73
GLUCOSE SERPL-MCNC: 94 MG/DL (ref 65–99)
HCT VFR BLD AUTO: 38 % (ref 37.5–51)
HGB BLD-MCNC: 12.2 G/DL (ref 13–17.7)
MCH RBC QN AUTO: 27.4 PG (ref 26.6–33)
MCHC RBC AUTO-ENTMCNC: 32.1 G/DL (ref 31.5–35.7)
MCV RBC AUTO: 85.2 FL (ref 79–97)
PLATELET # BLD AUTO: 195 10*3/MM3 (ref 140–450)
PMV BLD AUTO: 10.3 FL (ref 6–12)
POTASSIUM SERPL-SCNC: 4 MMOL/L (ref 3.5–5.2)
RBC # BLD AUTO: 4.46 10*6/MM3 (ref 4.14–5.8)
SODIUM SERPL-SCNC: 142 MMOL/L (ref 136–145)
WBC # BLD AUTO: 9.95 10*3/MM3 (ref 3.4–10.8)

## 2021-06-10 PROCEDURE — 25810000003 SODIUM CHLORIDE 0.9 % WITH KCL 20 MEQ 20-0.9 MEQ/L-% SOLUTION: Performed by: NURSE PRACTITIONER

## 2021-06-10 PROCEDURE — 25010000002 PROPOFOL 10 MG/ML EMULSION: Performed by: ANESTHESIOLOGY

## 2021-06-10 PROCEDURE — 85027 COMPLETE CBC AUTOMATED: CPT | Performed by: PHYSICIAN ASSISTANT

## 2021-06-10 PROCEDURE — 0T778DZ DILATION OF LEFT URETER WITH INTRALUMINAL DEVICE, VIA NATURAL OR ARTIFICIAL OPENING ENDOSCOPIC: ICD-10-PCS | Performed by: UROLOGY

## 2021-06-10 PROCEDURE — 76000 FLUOROSCOPY <1 HR PHYS/QHP: CPT

## 2021-06-10 PROCEDURE — 80048 BASIC METABOLIC PNL TOTAL CA: CPT | Performed by: PHYSICIAN ASSISTANT

## 2021-06-10 PROCEDURE — 25010000002 CEFTRIAXONE PER 250 MG: Performed by: PHYSICIAN ASSISTANT

## 2021-06-10 PROCEDURE — 25010000002 MORPHINE PER 10 MG: Performed by: INTERNAL MEDICINE

## 2021-06-10 PROCEDURE — C1894 INTRO/SHEATH, NON-LASER: HCPCS | Performed by: UROLOGY

## 2021-06-10 PROCEDURE — 25010000002 HYDROMORPHONE PER 4 MG: Performed by: ANESTHESIOLOGY

## 2021-06-10 PROCEDURE — C1769 GUIDE WIRE: HCPCS | Performed by: UROLOGY

## 2021-06-10 PROCEDURE — 25010000002 FENTANYL CITRATE (PF) 50 MCG/ML SOLUTION: Performed by: ANESTHESIOLOGY

## 2021-06-10 PROCEDURE — 25010000002 CEFTRIAXONE PER 250 MG: Performed by: UROLOGY

## 2021-06-10 PROCEDURE — 99233 SBSQ HOSP IP/OBS HIGH 50: CPT | Performed by: NURSE PRACTITIONER

## 2021-06-10 PROCEDURE — 25810000003 SODIUM CHLORIDE 0.9 % WITH KCL 20 MEQ 20-0.9 MEQ/L-% SOLUTION: Performed by: UROLOGY

## 2021-06-10 PROCEDURE — 25010000002 HYDROMORPHONE PER 4 MG: Performed by: NURSE PRACTITIONER

## 2021-06-10 PROCEDURE — 25010000002 DEXAMETHASONE PER 1 MG: Performed by: ANESTHESIOLOGY

## 2021-06-10 PROCEDURE — C2617 STENT, NON-COR, TEM W/O DEL: HCPCS | Performed by: UROLOGY

## 2021-06-10 DEVICE — URETERAL STENT
Type: IMPLANTABLE DEVICE | Site: URETER | Status: FUNCTIONAL
Brand: PERCUFLEX™ PLUS

## 2021-06-10 RX ORDER — DEXAMETHASONE SODIUM PHOSPHATE 10 MG/ML
INJECTION INTRAMUSCULAR; INTRAVENOUS AS NEEDED
Status: DISCONTINUED | OUTPATIENT
Start: 2021-06-10 | End: 2021-06-10 | Stop reason: SURG

## 2021-06-10 RX ORDER — KETOROLAC TROMETHAMINE 30 MG/ML
30 INJECTION, SOLUTION INTRAMUSCULAR; INTRAVENOUS EVERY 6 HOURS PRN
Status: DISCONTINUED | OUTPATIENT
Start: 2021-06-10 | End: 2021-06-11 | Stop reason: HOSPADM

## 2021-06-10 RX ORDER — SODIUM CHLORIDE 0.9 % (FLUSH) 0.9 %
10 SYRINGE (ML) INJECTION AS NEEDED
Status: DISCONTINUED | OUTPATIENT
Start: 2021-06-10 | End: 2021-06-10 | Stop reason: HOSPADM

## 2021-06-10 RX ORDER — PROPOFOL 10 MG/ML
VIAL (ML) INTRAVENOUS AS NEEDED
Status: DISCONTINUED | OUTPATIENT
Start: 2021-06-10 | End: 2021-06-10 | Stop reason: SURG

## 2021-06-10 RX ORDER — LIDOCAINE HYDROCHLORIDE 10 MG/ML
0.5 INJECTION, SOLUTION EPIDURAL; INFILTRATION; INTRACAUDAL; PERINEURAL ONCE AS NEEDED
Status: CANCELLED | OUTPATIENT
Start: 2021-06-10

## 2021-06-10 RX ORDER — SODIUM CHLORIDE AND POTASSIUM CHLORIDE 150; 900 MG/100ML; MG/100ML
75 INJECTION, SOLUTION INTRAVENOUS CONTINUOUS
Status: DISCONTINUED | OUTPATIENT
Start: 2021-06-10 | End: 2021-06-11 | Stop reason: HOSPADM

## 2021-06-10 RX ORDER — MAGNESIUM HYDROXIDE 1200 MG/15ML
LIQUID ORAL AS NEEDED
Status: DISCONTINUED | OUTPATIENT
Start: 2021-06-10 | End: 2021-06-10 | Stop reason: HOSPADM

## 2021-06-10 RX ORDER — FENTANYL CITRATE 50 UG/ML
INJECTION, SOLUTION INTRAMUSCULAR; INTRAVENOUS AS NEEDED
Status: DISCONTINUED | OUTPATIENT
Start: 2021-06-10 | End: 2021-06-10 | Stop reason: SURG

## 2021-06-10 RX ORDER — HYDROMORPHONE HYDROCHLORIDE 1 MG/ML
0.5 INJECTION, SOLUTION INTRAMUSCULAR; INTRAVENOUS; SUBCUTANEOUS
Status: DISCONTINUED | OUTPATIENT
Start: 2021-06-10 | End: 2021-06-10 | Stop reason: HOSPADM

## 2021-06-10 RX ORDER — LIDOCAINE HYDROCHLORIDE 20 MG/ML
JELLY TOPICAL AS NEEDED
Status: DISCONTINUED | OUTPATIENT
Start: 2021-06-10 | End: 2021-06-10 | Stop reason: HOSPADM

## 2021-06-10 RX ORDER — FAMOTIDINE 10 MG/ML
20 INJECTION, SOLUTION INTRAVENOUS
Status: COMPLETED | OUTPATIENT
Start: 2021-06-10 | End: 2021-06-10

## 2021-06-10 RX ORDER — LIDOCAINE HYDROCHLORIDE 20 MG/ML
INJECTION, SOLUTION INFILTRATION; PERINEURAL AS NEEDED
Status: DISCONTINUED | OUTPATIENT
Start: 2021-06-10 | End: 2021-06-10 | Stop reason: SURG

## 2021-06-10 RX ORDER — HYDROMORPHONE HYDROCHLORIDE 1 MG/ML
0.5 INJECTION, SOLUTION INTRAMUSCULAR; INTRAVENOUS; SUBCUTANEOUS
Status: DISCONTINUED | OUTPATIENT
Start: 2021-06-10 | End: 2021-06-11 | Stop reason: HOSPADM

## 2021-06-10 RX ORDER — FENTANYL CITRATE 50 UG/ML
50 INJECTION, SOLUTION INTRAMUSCULAR; INTRAVENOUS
Status: DISCONTINUED | OUTPATIENT
Start: 2021-06-10 | End: 2021-06-10 | Stop reason: HOSPADM

## 2021-06-10 RX ORDER — SODIUM CHLORIDE, SODIUM LACTATE, POTASSIUM CHLORIDE, CALCIUM CHLORIDE 600; 310; 30; 20 MG/100ML; MG/100ML; MG/100ML; MG/100ML
1000 INJECTION, SOLUTION INTRAVENOUS CONTINUOUS
Status: DISCONTINUED | OUTPATIENT
Start: 2021-06-10 | End: 2021-06-11 | Stop reason: HOSPADM

## 2021-06-10 RX ADMIN — HYDROMORPHONE HYDROCHLORIDE 0.5 MG: 1 INJECTION, SOLUTION INTRAMUSCULAR; INTRAVENOUS; SUBCUTANEOUS at 13:33

## 2021-06-10 RX ADMIN — DEXAMETHASONE SODIUM PHOSPHATE 8 MG: 10 INJECTION INTRAMUSCULAR; INTRAVENOUS at 19:43

## 2021-06-10 RX ADMIN — SODIUM CHLORIDE, POTASSIUM CHLORIDE, SODIUM LACTATE AND CALCIUM CHLORIDE 1000 ML: 600; 310; 30; 20 INJECTION, SOLUTION INTRAVENOUS at 18:52

## 2021-06-10 RX ADMIN — MORPHINE SULFATE 1 MG: 2 INJECTION, SOLUTION INTRAMUSCULAR; INTRAVENOUS at 04:05

## 2021-06-10 RX ADMIN — HYDROCODONE BITARTRATE AND ACETAMINOPHEN 1 TABLET: 5; 325 TABLET ORAL at 06:55

## 2021-06-10 RX ADMIN — POTASSIUM CHLORIDE AND SODIUM CHLORIDE 75 ML/HR: 900; 150 INJECTION, SOLUTION INTRAVENOUS at 12:16

## 2021-06-10 RX ADMIN — HYDROCODONE BITARTRATE AND ACETAMINOPHEN 1 TABLET: 5; 325 TABLET ORAL at 15:46

## 2021-06-10 RX ADMIN — LIDOCAINE HYDROCHLORIDE 100 MG: 20 INJECTION, SOLUTION INFILTRATION; PERINEURAL at 19:40

## 2021-06-10 RX ADMIN — PROPOFOL 200 MG: 10 INJECTION, EMULSION INTRAVENOUS at 19:40

## 2021-06-10 RX ADMIN — HYDROMORPHONE HYDROCHLORIDE 0.5 MG: 1 INJECTION, SOLUTION INTRAMUSCULAR; INTRAVENOUS; SUBCUTANEOUS at 17:36

## 2021-06-10 RX ADMIN — TAMSULOSIN HYDROCHLORIDE 0.4 MG: 0.4 CAPSULE ORAL at 21:37

## 2021-06-10 RX ADMIN — SODIUM CHLORIDE 1 G: 900 INJECTION INTRAVENOUS at 21:37

## 2021-06-10 RX ADMIN — FAMOTIDINE 20 MG: 10 INJECTION INTRAVENOUS at 18:52

## 2021-06-10 RX ADMIN — HYDROMORPHONE HYDROCHLORIDE 0.5 MG: 1 INJECTION, SOLUTION INTRAMUSCULAR; INTRAVENOUS; SUBCUTANEOUS at 20:49

## 2021-06-10 RX ADMIN — MORPHINE SULFATE 1 MG: 2 INJECTION, SOLUTION INTRAMUSCULAR; INTRAVENOUS at 07:34

## 2021-06-10 RX ADMIN — FENTANYL CITRATE 100 MCG: 50 INJECTION, SOLUTION INTRAMUSCULAR; INTRAVENOUS at 19:40

## 2021-06-10 RX ADMIN — POTASSIUM CHLORIDE AND SODIUM CHLORIDE 75 ML/HR: 900; 150 INJECTION, SOLUTION INTRAVENOUS at 21:22

## 2021-06-10 NOTE — ANESTHESIA PREPROCEDURE EVALUATION
Anesthesia Evaluation     Patient summary reviewed and Nursing notes reviewed                Airway   Mallampati: II  TM distance: >3 FB  Neck ROM: full  No difficulty expected  Dental - normal exam     Pulmonary - negative pulmonary ROS and normal exam   Cardiovascular - negative cardio ROS and normal exam        Neuro/Psych- negative ROS  GI/Hepatic/Renal/Endo    (+) morbid obesity,  renal disease stones,     ROS Comment: UTI    Musculoskeletal (-) negative ROS    Abdominal  - normal exam    Bowel sounds: normal.   Substance History - negative use     OB/GYN negative ob/gyn ROS         Other                        Anesthesia Plan    ASA 3     general     intravenous induction     Anesthetic plan, all risks, benefits, and alternatives have been provided, discussed and informed consent has been obtained with: patient.    Plan discussed with CRNA.

## 2021-06-10 NOTE — H&P
[facility] Medicine Services  HISTORY AND PHYSICAL    Patient Name: Faizan Romero  : 1990  MRN: [id number]  Primary Care Physician: Burton, No Known  Date of admission: 2021    Subjective   Subjective     Chief Complaint:  Left flank pain     HPI:  Faizan Romero is a 30 y.o. male with a medical history significant for kidney stones who presented to [facility] ED c/o left-sided flank pain. The pain started Monday and then got significantly worse yesterday morning. He denies radiation of the pain. He rates it an 8/10 at its worst. The patient was evaluated in our ED yesterday. CT of the abdomen pelvis demonstrated a 4 mm obstructing stone at the proximal left ureter with mild associated obstructive nephropathy. He was sent home with oral Dilaudid and referral to a urologist. The patient states when he returned home the pain was so bad he was taking the pain medication q3h. He called the urology office today and was told he would be seen in the office no sooner than . The patient decided to return to the ED for pain control. He reports a history of kidney stone 4 years ago that required a stent and subsequent lithotripsy due to scarring of his ureter. He denies fever or chills. He admits to several episodes of vomiting. No use of tobacco or drugs. Social alcohol use. While in the ED, the patient's vitals were all stable.  Labs revealed creatinine 1.30, WBC 18.86.  Urine positive for leukocytes and trace bacteria.The patient will be admitted to the hospitalist service for further medical management.     COVID Details:        Symptoms: [] NONE [] Fever []  Cough [] Shortness of breath [] Change in taste or smell  The patient qualifies to receive the vaccine, but they have not yet received it.    Review of Systems   Constitutional: Negative for chills, diaphoresis, fatigue and fever.   HENT: Negative for congestion, sore throat and trouble swallowing.    Eyes:  Negative for pain and visual disturbance.   Respiratory: Negative for cough, shortness of breath and wheezing.    Cardiovascular: Negative for chest pain, palpitations and leg swelling.   Gastrointestinal: Positive for nausea and vomiting. Negative for abdominal pain, blood in stool, constipation and diarrhea.   Genitourinary: Positive for decreased urine volume, dysuria and flank pain (left). Negative for difficulty urinating, discharge, enuresis, frequency, genital sores, hematuria, penile pain, penile swelling, scrotal swelling, testicular pain and urgency.   Musculoskeletal: Negative for back pain and gait problem.   Skin: Negative for rash and wound.   Neurological: Negative for dizziness, syncope, speech difficulty, weakness and headaches.   Hematological: Negative for adenopathy. Does not bruise/bleed easily.   Psychiatric/Behavioral: Negative for agitation and confusion.      All other systems reviewed and are negative.     Personal History     Past Medical History:   Diagnosis Date   • Kidney stone    • Renal disorder        Past Surgical History:   Procedure Laterality Date   • CYSTOSCOPY URETEROSCOPY STONE MANIPULATION/EXTRACTION Left 3/29/2017    Procedure: LEFT URETEROSCOPY, LEFT URETERAL DILATION, STENT PLACEMENT;  Surgeon: Niles Lomas MD;  Location: [facility];  Service:    • CYSTOSCOPY URETEROSCOPY STONE MANIPULATION/EXTRACTION Left 4/12/2017    Procedure: LEFT CYSTOSCOPY URETEROSCOPY STONE MANIPULATION/EXTRACTION, LASER LITHOTRIPSY LEFT URETERAL DILATION, REMOVAL OF STENT, PLACEMENT JJ STENT 6X26;  Surgeon: Niles Lomas MD;  Location: [facility];  Service:    • THROAT SURGERY      benign cyst removed at age 5.       Family History: family history is not on file. Otherwise pertinent FHx was reviewed and unremarkable.     Social History:  reports that he has never smoked. He has quit using smokeless tobacco. He reports current alcohol use of about 3.0 standard drinks of alcohol per week. He  reports that he does not use drugs.  Social History     Social History Narrative   • Not on file       Medications:  HYDROmorphone, naproxen, ondansetron ODT, and tamsulosin    Allergies   Allergen Reactions   • Shellfish-Derived Products Anaphylaxis       Objective   Objective     Vital Signs:   Temp:  [97 °F (36.1 °C)] 97 °F (36.1 °C)  Heart Rate:  [87-88] 87  Resp:  [16-18] 16  BP: (133-150)/() 150/93    Physical Exam   Constitutional: Awake, alert, healthy appearing, obese, lying in bed, comfortable    Eyes: PERRLA, sclerae anicteric, no conjunctival injection  HENT: NCAT, mucous membranes moist, face symmetric, dentition normal   Neck: Supple, no thyromegaly, no lymphadenopathy, trachea midline, no meningeal signs   Respiratory: Clear to auscultation bilaterally, no audible wheezes, nonlabored respirations, able to speak in complete sentences   Cardiovascular: RRR, no murmurs appreciated, palpable PT pulses bilaterally  Gastrointestinal: Positive bowel sounds, soft, nontender, no distention, no guarding, no peritoneal signs    Musculoskeletal: No bilateral ankle edema, no clubbing or cyanosis to extremities, no CVA tenderness   Psychiatric: Appropriate affect, cooperative, thought process and content normal   Neurologic: Oriented x 3, moves all extremities, normal tone, strength and sensation symmetric in all extremities, Cranial Nerves grossly intact to confrontation, speech clear  Skin: Cool dry, no rashes, wounds or lesions noted, turgor normal     Result Review:  I have personally reviewed the results from the time of this admission to 06/09/21 10:46 PM EDT and agree with these findings:  [x]  Laboratory  [x]  Microbiology  [x]  Radiology  []  EKG/Telemetry   []  Cardiology/Vascular   []  Pathology  []  Old records  []  Other:  Most notable findings include: See HPI       LAB RESULTS:      Lab 06/09/21  1940 06/08/21  0653   WBC 18.86* 12.97*   HEMOGLOBIN 14.3 14.7   HEMATOCRIT 44.6 45.1   PLATELETS  245 249   NEUTROS ABS 13.35* 6.76   IMMATURE GRANS (ABS) 0.13* 0.20*   LYMPHS ABS 3.86* 4.61*   MONOS ABS 1.40* 0.89   EOS ABS 0.06 0.42*   MCV 83.8 82.9         Lab 06/09/21 1940 06/08/21  0653   SODIUM 140 143   POTASSIUM 4.1 4.1   CHLORIDE 103 107   CO2 25.0 25.0   ANION GAP 12.0 11.0   BUN 22* 15   CREATININE 1.30* 0.88   GLUCOSE 99 138*   CALCIUM 9.2 9.7         Lab 06/09/21 1940 06/08/21  0653   TOTAL PROTEIN 6.5 7.5   ALBUMIN 4.00 4.40   GLOBULIN 2.5 3.1   ALT (SGPT) 41 46*   AST (SGOT) 21 22   BILIRUBIN 0.6 0.9   ALK PHOS 111 138*   LIPASE 19 20                     UA    Urinalysis 6/8/21 6/8/21 6/9/21 6/9/21    0656 [id number] 2002 2002   Squamous Epithelial Cells, UA 0-2   3-6 (A)   Specific Gravity, UA  1.025 1.037 (A)    Ketones, UA  Negative Negative    Blood, UA  Large (3+) (A) Negative    Leukocytes, UA  Negative Small (1+) (A)    Nitrite, UA  Negative Negative    RBC, UA 13-20 (A)   3-6 (A)   WBC, UA 6-12 (A)   Too Numerous to Count (A)   Bacteria, UA None Seen   Trace   (A) Abnormal value            Microbiology Results (last 10 days)     ** No results found for the last 240 hours. **          CT Abdomen Pelvis Without Contrast    Result Date: 6/8/2021  EXAMINATION: CT ABDOMEN PELVIS WO CONTRAST-  INDICATION: flank pain, h/o stones  TECHNIQUE: Axial noncontrast CT of the abdomen and pelvis with multiplanar reconstruction  The radiation dose reduction device was turned on for each scan per the ALARA (As Low as Reasonably Achievable) protocol.  COMPARISON: 4/14/2017  FINDINGS: The lung bases are grossly clear. Body wall soft tissues are unremarkable. There is no evidence of fracture or aggressive osseous lesion. The liver, spleen, pancreas and bilateral adrenal glands demonstrate homogeneous attenuation without evidence of suspicious focal lesion. The gallbladder is unremarkable. Small and large bowel loops are nondilated. There is no suspicious focal bowel wall thickening. The appendix is unremarkable.  No free fluid or pneumoperitoneum. There is a 4 mm calculus noted proximally in the left ureter with mild associated left-sided obstructive nephropathy and minimal hydronephrosis. The right kidney is unremarkable. The pelvic viscera are unremarkable.      Impression: 4 mm obstructing stone at the proximal left ureter with mild associated obstructive nephropathy.   This report was finalized on 6/8/2021 8:39 AM by Drew Mast.            Assessment/Plan   Assessment & Plan       Kidney stone on left side    Elevated serum creatinine    UTI (urinary tract infection)    Faizan Romero is a 30 y.o. male with a medical history significant for kidney stones who presented to [facility] ED c/o left-sided flank pain.    Kidney stone   Urinary tract infection   Elevated creatinine   -CT of abdomen pelvis noted above  -Blood cultures x 2   -Rocephin  -Supportive care  -IV fluids  -Consult urology   -Avoid nephrotoxins     DVT prophylaxis: SCDs    CODE STATUS:  FULL CODE      This note has been completed as part of a split-shared workflow.   Signature: Electronically signed by Bouchra Rodriguez PA-C, 06/09/21, 11:19 PM EDT      Attending   Admission Attestation       I have seen and examined the patient, performing an independent face-to-face diagnostic evaluation with plan of care reviewed and developed with the advanced practice clinician (APC).      Brief Summary Statement:   30-year-old male with a history of kidney stones in the past requiring a stent who presents with pain.  He initially presented 6/8/2021 with acute onset sharp left flank pain.  He was noted to have a stone at that time with a mild leukocytosis.  His pain improved with pain medications.  Urology was consulted and recommended outpatient work-up.  He was discharged with Zofran, Flomax and oral Dilaudid.  He presents again today due to worsening pain and he felt like he was needing more pain medications.  Patient has been taking Dilaudid, naproxen, Tylenol  without improvement in his pain.  Labs on presentation showed an elevated creatinine of 1.3 which is increased from the day prior.  He also has a worsening leukocytosis of 18.8.  UA obtained showed cloudy urine with numerous white blood cell count and trace bacteria which is new.  Go ahead and start patient on IV fluids, Flomax, pain medications.  We will add on urine culture urology consult in the a.m.  Repeat BMP in the a.m.  N.p.o. at midnight.    Remainder of detailed HPI is as noted by APC and has been reviewed and/or edited by me for completeness.    Attending Physical Exam:  Constitutional: Awake, alert, no acute distress  Eyes: PERRL, sclerae anicteric, no conjunctival injection  HENT: NCAT, mucous membranes moist  Neck: Supple, no thyromegaly, no lymphadenopathy, trachea midline  Respiratory: Clear to auscultation bilaterally, nonlabored respirations   Cardiovascular: RRR, no murmurs, rubs, or gallops, palpable pedal pulses bilaterally  Gastrointestinal: Positive bowel sounds, soft, nontender, nondistended  Musculoskeletal: No bilateral ankle edema, no clubbing or cyanosis to extremities  Psychiatric: Appropriate affect, cooperative  Neurologic: Oriented x 3, no focal neurological deficits  Skin: No rashes      Brief Assessment/Plan :  See detailed assessment and plan developed with APC which I have reviewed and/or edited for completeness.        Admission Status: I believe that this patient meets INPATIENT status due to kidney stone with CA, UTI requiring intervention.  I feel patient’s risk for adverse outcomes and need for care warrant INPATIENT evaluation and I predict the patient’s care encounter to likely last beyond 2 midnights.        Lorena Mejia MD  06/09/21

## 2021-06-10 NOTE — CONSULTS
Consult    Patient Name: Faizan Romero  Medical Record Number: [id number]  YOB: 1990    Date of consultation: 6/10/2021    Referring Provider:Lorena Mejia MD  Reason for Consultation: Left ureteral stone with renal colic    Patient Care Team:  Provider, No Known as PCP - General    Chief complaint   Chief Complaint   Patient presents with   • Flank Pain       Subjective .     History of present illness:    This is a pleasant 30-year-old gentleman with a history of kidney stone disease who presents with 1 to 2 days of severe left renal colic secondary to 4 mm proximal left ureteral stone identified on CT scan imaging.  The patient still has significant discomfort despite analgesic therapy here in the hospital and is interested in surgical therapy for his stone.  He has had stones in the past and has never passed a stone.  In fact at one point he had left ureteroscopy after ureteral dilation since they could not obtain access with ureteroscope initially due to diminutive size of the ureters.  He denies fevers and chills.  He denies vomiting.  He denies hematuria.    Past Medical History:   Diagnosis Date   • Kidney stone    • Renal disorder      Past Surgical History:   Procedure Laterality Date   • CYSTOSCOPY URETEROSCOPY STONE MANIPULATION/EXTRACTION Left 3/29/2017    Procedure: LEFT URETEROSCOPY, LEFT URETERAL DILATION, STENT PLACEMENT;  Surgeon: Niles Lomas MD;  Location:  [facility];  Service:    • CYSTOSCOPY URETEROSCOPY STONE MANIPULATION/EXTRACTION Left 4/12/2017    Procedure: LEFT CYSTOSCOPY URETEROSCOPY STONE MANIPULATION/EXTRACTION, LASER LITHOTRIPSY LEFT URETERAL DILATION, REMOVAL OF STENT, PLACEMENT JJ STENT 6X26;  Surgeon: Niles Lomas MD;  Location: [facility];  Service:    • THROAT SURGERY      benign cyst removed at age 5.     History reviewed. No pertinent family history.  Social History     Tobacco Use   • Smoking status: Never Smoker   • Smokeless tobacco:  "Former User   Substance Use Topics   • Alcohol use: Yes     Alcohol/week: 3.0 standard drinks     Types: 3 Cans of beer per week     Comment: pt drinks once a week   • Drug use: No     Medications Prior to Admission   Medication Sig Dispense Refill Last Dose   • HYDROmorphone (DILAUDID) 2 MG tablet Take 2 tablets by mouth Every 6 (Six) Hours As Needed for Moderate Pain . 12 tablet 0 6/9/2021 at Unknown time   • naproxen (NAPROSYN) 250 MG tablet Take 1 tablet by mouth 2 (Two) Times a Day With Meals. 24 tablet 0 6/9/2021 at Unknown time   • ondansetron ODT (ZOFRAN-ODT) 4 MG disintegrating tablet Place 1 tablet on the tongue Every 8 (Eight) Hours As Needed for Nausea. 15 tablet 0 6/8/2021 at Unknown time   • tamsulosin (FLOMAX) 0.4 MG capsule 24 hr capsule Take 1 capsule by mouth Every Night. Until pain is gone or the kidney stone passes 15 capsule 0 6/8/2021 at Unknown time     Allergies:  Shellfish-derived products    Review of Systems  Pertinent items are noted in HPI, all other systems reviewed and negative    Objective     Vital Signs   /76 (BP Location: Right arm, Patient Position: Lying)   Pulse 76   Temp 97.9 °F (36.6 °C) (Oral)   Resp 16   Ht 182.9 cm (72\")   Wt (!) 156 kg (343 lb 6.4 oz)   SpO2 97%   BMI 46.57 kg/m²     Physical Exam:  General Appearance: Mild distress  Back: No No kyphosis present, no scoliosis present,no tenderness to percussion or Palpation  Lungs: Respirations regular, even and  unlabored  Heart: Regular rhythm and normal rate  Chest Wall: No abnormalities observed  Abdomen: Obese, benign, no costovertebral angle tenderness  Genital: Normal  Rectal: Deferred  Extremities: Moves all extremities well, no edema no cyanosis, no redness  Pulses: Pulses palpable  Skin: No bleeding, bruising or rash  Lymph nodes: No palpable adenopathy  Neurologic: Neurologically grossly intact    Results Review:  Lab Results (last 24 hours)     Procedure Component Value Units Date/Time    Basic " Metabolic Panel [[id number]]  (Abnormal) Collected: 06/10/21 0820    Specimen: Blood Updated: 06/10/21 0931     Glucose 94 mg/dL      BUN 22 mg/dL      Creatinine 0.99 mg/dL      Sodium 142 mmol/L      Potassium 4.0 mmol/L      Chloride 108 mmol/L      CO2 24.0 mmol/L      Calcium 8.3 mg/dL      eGFR Non African Amer 90 mL/min/1.73      BUN/Creatinine Ratio 22.2     Anion Gap 10.0 mmol/L     Narrative:      GFR Normal >60  Chronic Kidney Disease <60  Kidney Failure <15      CBC (No Diff) [[id number]]  (Abnormal) Collected: 06/10/21 0820    Specimen: Blood Updated: 06/10/21 0903     WBC 9.95 10*3/mm3      RBC 4.46 10*6/mm3      Hemoglobin 12.2 g/dL      Hematocrit 38.0 %      MCV 85.2 fL      MCH 27.4 pg      MCHC 32.1 g/dL      RDW 13.4 %      RDW-SD 42.1 fl      MPV 10.3 fL      Platelets 195 10*3/mm3     Lactic Acid, Plasma [[id number]]  (Normal) Collected: 06/09/21 2346    Specimen: Blood Updated: 06/10/21 0139     Lactate 0.8 mmol/L      Comment: Falsely depressed results may occur on samples drawn from patients receiving N-Acetylcysteine (NAC) or Metamizole.       Blood Culture - Blood, Hand, Right [[id number]] Collected: 06/09/21 2350    Specimen: Blood from Hand, Right Updated: 06/10/21 0101    Blood Culture - Blood, Arm, Right [[id number]] Collected: 06/09/21 2346    Specimen: Blood from Arm, Right Updated: 06/10/21 0100    Urine Culture - Urine, Urine, Clean Catch [[id number]] Collected: 06/09/21 2002    Specimen: Urine, Clean Catch Updated: 06/09/21 2352    [place] Draw [[id number]] Collected: 06/09/21 1940    Specimen: Blood Updated: 06/09/21 2346    Narrative:      The following orders were created for panel order [place] Draw.  Procedure                               Abnormality         Status                     ---------                               -----------         ------                     Green Top (Gel)[[id number]]                                  Final result               Lavender Top[[id number]]                                      Final result               Gold Top - SST[[id number]]                                   Final result               Riggins Top[[id number]]                                         Final result                 Please view results for these tests on the individual orders.    Gray Top [[id number]] Collected: 06/09/21 1940    Specimen: Blood Updated: 06/09/21 2346     Extra Tube Hold for add-ons.     Comment: Auto resulted.       COVID PRE-OP / PRE-PROCEDURE SCREENING ORDER (NO ISOLATION) - Swab, Nasopharynx [[id number]]  (Normal) Collected: 06/09/21 2214    Specimen: Swab from Nasopharynx Updated: 06/09/21 2246    Narrative:      The following orders were created for panel order COVID PRE-OP / PRE-PROCEDURE SCREENING ORDER (NO ISOLATION) - Swab, Nasopharynx.  Procedure                               Abnormality         Status                     ---------                               -----------         ------                     COVID-19 and FLU A/B PCR...[[id number]]  Normal              Final result                 Please view results for these tests on the individual orders.    COVID-19 and FLU A/B PCR - Swab, Nasopharynx [[id number]]  (Normal) Collected: 06/09/21 2214    Specimen: Swab from Nasopharynx Updated: 06/09/21 2246     COVID19 Not Detected     Influenza A PCR Not Detected     Influenza B PCR Not Detected    Narrative:      Fact sheet for providers: https://www.fda.gov/media/796223/download    Fact sheet for patients: https://www.fda.gov/media/338873/download    Test performed by PCR.        Imaging Results (Last 72 Hours)     ** No results found for the last 72 hours. **           I reviewed the patient's new clinical results.      Assessment and Recommendations  Left ureteral stone  Left renal colic poorly controlled with IV pain medicine    At this point the patient wishes to proceed with ureteroscopy and stone extraction.  We talked to the patient about the potential for stent  placement and ureteral dilation especially given his history of small ureters requiring this approach.  He understands the potential need for delayed disposition of the stone.      Kidney stone on left side    Elevated serum creatinine    UTI (urinary tract infection)      I discussed the patients findings and my recommendations with patient and family    Tyrone Garza Jr., MD  06/10/21  17:55 EDT

## 2021-06-10 NOTE — CASE MANAGEMENT/SOCIAL WORK
Discharge Planning Assessment  [facility]     Patient Name: Faizan Romero  MRN: [id number]  Today's Date: 6/10/2021    Admit Date: 6/9/2021    Discharge Needs Assessment     Row Name 06/10/21 0850       Living Environment    Lives With  significant other    Name(s) of Who Lives With Patient  Padmini ARGUETA) [phone]    Current Living Arrangements  home/apartment/condo    Primary Care Provided by  self    Provides Primary Care For  no one    Family Caregiver if Needed  significant other    Family Caregiver Names  Padmini oDrsey ([place])       Resource/Environmental Concerns    Resource/Environmental Concerns  none    Transportation Concerns  car, none       Transition Planning    Patient/Family Anticipates Transition to  home with family    Patient/Family Anticipated Services at Transition  none    Transportation Anticipated  family or friend will provide       Discharge Needs Assessment    Readmission Within the Last 30 Days  no previous admission in last 30 days    Equipment Currently Used at Home  none    Concerns to be Addressed  denies needs/concerns at this time    Anticipated Changes Related to Illness  none    Equipment Needed After Discharge  none        Discharge Plan     Row Name 06/10/21 2365       Plan    Plan  Home with family    Patient/Family in Agreement with Plan  yes    Plan Comments  Spoke with patient at bedside. Lives Padmini Dorsey ([employer]) [phone] in [place]. Is independent with ADL's. No problems with [place] Medicaid or medications. Uses a no DME at home. Plan is home with family. CM will continue to follow.    Final Discharge Disposition Code  01 - home or self-care        Continued Care and Services - Admitted Since 6/9/2021    Coordination has not been started for this encounter.         Demographic Summary     Row Name 06/10/21 0849       General Information    Admission Type  inpatient    Arrived From  emergency department    Referral Source  admission list    Reason for Consult   discharge planning    Preferred Language  English     Used During This Interaction  no       Contact Information    Permission Granted to Share Info With      Contact Information Obtained for          Functional Status     Row Name 06/10/21 0849       Functional Status    Usual Activity Tolerance  excellent    Current Activity Tolerance  excellent       Functional Status, IADL    Medications  independent    Meal Preparation  independent    Housekeeping  independent    Laundry  independent    Shopping  independent       Mental Status    General Appearance WDL  WDL       Mental Status Summary    Recent Changes in Mental Status/Cognitive Functioning  no changes       Employment/    Employment Status  self-employed        Psychosocial    No documentation.       Abuse/Neglect    No documentation.       Legal    No documentation.       Substance Abuse    No documentation.       Patient Forms    No documentation.           Chan Tavarez RN

## 2021-06-10 NOTE — PROGRESS NOTES
[facility] Medicine Services  PROGRESS NOTE    Patient Name: Faizan Romero  : 1990  MRN: [id number]    Date of Admission: 2021  Primary Care Physician: Provider, No Known    Subjective   Subjective     CC:  Left Flank Pain    HPI:  Patient reports being comfortable right now. Pain waxes and wanes. No n/v, f/c/s or voiding issue.    ROS:  Gen- No fevers, chills  CV- No chest pain, palpitations  Resp- No cough, dyspnea  GI- No N/V/D, + abd pain/ flank pain        Objective   Objective     Vital Signs:   Temp:  [97 °F (36.1 °C)-98.3 °F (36.8 °C)] 97.8 °F (36.6 °C)  Heart Rate:  [58-92] 68  Resp:  [16-18] 18  BP: (118-159)/() 145/77        Physical Exam:  Constitutional: No acute distress, awake, alert, lying in bed  HENT: NCAT, mucous membranes moist  Respiratory: Clear to auscultation bilaterally, respiratory effort normal   Cardiovascular: RRR, no murmurs, rubs, or gallops  Gastrointestinal: Positive bowel sounds, soft, nontender, nondistended  + left flank tenderness  Musculoskeletal: No bilateral ankle edema  Psychiatric: Appropriate affect, cooperative  Neurologic: Oriented x 3, strength symmetric in all extremities, Cranial Nerves grossly intact to confrontation, speech clear  Skin: No rashes      Results Reviewed:  Results from last 7 days   Lab Units 06/10/21  0820 06/09/21  1940 06/08/21  0653   WBC 10*3/mm3 9.95 18.86* 12.97*   HEMOGLOBIN g/dL 12.2* 14.3 14.7   HEMATOCRIT % 38.0 44.6 45.1   PLATELETS 10*3/mm3 195 245 249     Results from last 7 days   Lab Units 06/10/21  0821  0653   SODIUM mmol/L 142 140 143   POTASSIUM mmol/L 4.0 4.1 4.1   CHLORIDE mmol/L 108* 103 107   CO2 mmol/L 24.0 25.0 25.0   BUN mg/dL 22* 22* 15   CREATININE mg/dL 0.99 1.30* 0.88   GLUCOSE mg/dL 94 99 138*   CALCIUM mg/dL 8.3* 9.2 9.7   ALT (SGPT) U/L  --  41 46*   AST (SGOT) U/L  --  21 22     Estimated Creatinine Clearance: 171.3 mL/min (by C-G formula based  on SCr of 0.99 mg/dL).    Microbiology Results Abnormal     Procedure Component Value - Date/Time    COVID PRE-OP / PRE-PROCEDURE SCREENING ORDER (NO ISOLATION) - Swab, Nasopharynx [[id number]]  (Normal) Collected: 06/09/21 2214    Lab Status: Final result Specimen: Swab from Nasopharynx Updated: 06/09/21 2246    Narrative:      The following orders were created for panel order COVID PRE-OP / PRE-PROCEDURE SCREENING ORDER (NO ISOLATION) - Swab, Nasopharynx.  Procedure                               Abnormality         Status                     ---------                               -----------         ------                     COVID-19 and FLU A/B PCR...[[id number]]  Normal              Final result                 Please view results for these tests on the individual orders.    COVID-19 and FLU A/B PCR - Swab, Nasopharynx [[id number]]  (Normal) Collected: 06/09/21 2214    Lab Status: Final result Specimen: Swab from Nasopharynx Updated: 06/09/21 2246     COVID19 Not Detected     Influenza A PCR Not Detected     Influenza B PCR Not Detected    Narrative:      Fact sheet for providers: https://www.fda.gov/media/516252/download    Fact sheet for patients: https://www.fda.gov/media/220160/download    Test performed by PCR.          Imaging Results (Last 24 Hours)     ** No results found for the last 24 hours. **              I have reviewed the medications:  Scheduled Meds:cefTRIAXone, 1 g, Intravenous, Q24H  sodium chloride, 10 mL, Intravenous, Q12H  tamsulosin, 0.4 mg, Oral, Nightly      Continuous Infusions:sodium chloride 0.9 % with KCl 20 mEq, 75 mL/hr, Last Rate: 75 mL/hr (06/10/21 1216)      PRN Meds:.•  acetaminophen **OR** acetaminophen **OR** acetaminophen  •  HYDROcodone-acetaminophen  •  HYDROmorphone  •  ketorolac  •  melatonin  •  ondansetron **OR** ondansetron  •  Sodium Chloride (PF)  •  sodium chloride    Assessment/Plan   Assessment & Plan     Active Hospital Problems    Diagnosis  POA   • **Kidney  stone on left side [N20.0]  Yes   • Elevated serum creatinine [R79.89]  Yes   • UTI (urinary tract infection) [N39.0]  Yes      Resolved Hospital Problems   No resolved problems to display.        Brief Hospital Course to date:  Faizan Romero is a 30 y.o. male with pmh significant for kidney stones presents with left sided flank pain, found to have 4mm obstructing stone in left ureter:    Left ureterolithiasis  UTI  -- continue Rocephin D2  -- urology consulted. NPO until eval  -- Ucx and blood cx pending  -- change IVFs to NS + 20K  -- pain and nausea control. Will change to dilaudid q 3 hr and add toradol prn  --continue flomax    CA  -- resolving with IVFs, monitor      DVT Prophylaxis:  mechanical      Disposition: I expect the patient to be discharged home when cleared by urology    CODE STATUS:   Code Status and Medical Interventions:   Ordered at: 06/09/21 2826     Level Of Support Discussed With:    Patient     Code Status:    CPR     Medical Interventions (Level of Support Prior to Arrest):    Full       Shannon Hale, APRN  06/10/21

## 2021-06-10 NOTE — ED PROVIDER NOTES
Subjective   30-year-old male who presents with complaint of left flank pain with associated nausea and vomiting.  The patient was evaluated yesterday morning with his left flank pain identified to have a proximal left 4 mm ureteral stone with associated obstruction.  The patient was discharged with Dilaudid.  He has been taking 1 tablet  2 mg p.o. Dilaudid every 3 hours over the last day without significant improvement of symptoms.  He also has been taking naproxen and Tylenol in addition to the Dilaudid.  He has now ran out of the Dilaudid that was prescribed to him andhe has return to the ER for further evaluation.  He has a significant history of kidney stones in the past that have required intervention and stenting.  He reports having difficulty passing kidney stones in the past.  He last had intervention for kidney stones approxi-5 years ago by Dr. Fenton, who no longer works in [place].  He denies fever, bodies, chills.  No acute respiratory symptoms include no cough, chest pain, shortness of breath, sore throat, rhinorrhea, change in sense of taste or smell.  He denies change in bowel function.  He has not been diagnosed with COVID-19 and has not been vaccinated with COVID-19.  No other acute complaints.          Review of Systems   Constitutional: Negative for chills, fatigue and fever.   HENT: Negative for congestion, ear pain, postnasal drip, sinus pressure and sore throat.    Eyes: Negative for pain, redness and visual disturbance.   Respiratory: Negative for cough, chest tightness and shortness of breath.    Cardiovascular: Negative for chest pain, palpitations and leg swelling.   Gastrointestinal: Positive for nausea and vomiting. Negative for abdominal pain, anal bleeding, blood in stool and diarrhea.   Endocrine: Negative for polydipsia and polyuria.   Genitourinary: Positive for flank pain. Negative for difficulty urinating, dysuria, frequency and urgency.   Musculoskeletal: Negative for  arthralgias, back pain and neck pain.   Skin: Negative for pallor and rash.   Allergic/Immunologic: Negative for environmental allergies and immunocompromised state.   Neurological: Negative for dizziness, weakness and headaches.   Hematological: Negative for adenopathy.   Psychiatric/Behavioral: Negative for confusion, self-injury and suicidal ideas. The patient is not nervous/anxious.    All other systems reviewed and are negative.      Past Medical History:   Diagnosis Date   • Kidney stone    • Renal disorder        Allergies   Allergen Reactions   • Shellfish-Derived Products Anaphylaxis       Past Surgical History:   Procedure Laterality Date   • CYSTOSCOPY URETEROSCOPY STONE MANIPULATION/EXTRACTION Left 3/29/2017    Procedure: LEFT URETEROSCOPY, LEFT URETERAL DILATION, STENT PLACEMENT;  Surgeon: Niles Lomas MD;  Location:  [facility];  Service:    • CYSTOSCOPY URETEROSCOPY STONE MANIPULATION/EXTRACTION Left 4/12/2017    Procedure: LEFT CYSTOSCOPY URETEROSCOPY STONE MANIPULATION/EXTRACTION, LASER LITHOTRIPSY LEFT URETERAL DILATION, REMOVAL OF STENT, PLACEMENT JJ STENT 6X26;  Surgeon: Niles Lomas MD;  Location:  [facility];  Service:    • THROAT SURGERY      benign cyst removed at age 5.       History reviewed. No pertinent family history.    Social History     Socioeconomic History   • Marital status: Single     Spouse name: Not on file   • Number of children: Not on file   • Years of education: Not on file   • Highest education level: Not on file   Tobacco Use   • Smoking status: Never Smoker   • Smokeless tobacco: Former User   Substance and Sexual Activity   • Alcohol use: Yes     Alcohol/week: 3.0 standard drinks     Types: 3 Cans of beer per week     Comment: pt drinks once a week   • Drug use: No   • Sexual activity: Defer           Objective   Physical Exam  Vitals and nursing note reviewed.   Constitutional:       General: He is not in acute distress.     Appearance: Normal appearance. He is  well-developed. He is ill-appearing. He is not toxic-appearing or diaphoretic.   HENT:      Head: Normocephalic and atraumatic.      Right Ear: External ear normal.      Left Ear: External ear normal.      Nose: Nose normal.   Eyes:      General: Lids are normal.      Pupils: Pupils are equal, round, and reactive to light.   Neck:      Trachea: No tracheal deviation.   Cardiovascular:      Rate and Rhythm: Normal rate and regular rhythm.      Pulses: No decreased pulses.      Heart sounds: Normal heart sounds. No murmur heard.   No friction rub. No gallop.    Pulmonary:      Effort: Pulmonary effort is normal. No respiratory distress.      Breath sounds: Normal breath sounds. No decreased breath sounds, wheezing, rhonchi or rales.   Abdominal:      General: Bowel sounds are normal.      Palpations: Abdomen is soft.      Tenderness: There is no abdominal tenderness. There is no guarding or rebound.   Musculoskeletal:         General: No deformity. Normal range of motion.      Cervical back: Normal range of motion and neck supple.   Lymphadenopathy:      Cervical: No cervical adenopathy.   Skin:     General: Skin is warm and dry.      Findings: No rash.   Neurological:      Mental Status: He is alert and oriented to person, place, and time.      Cranial Nerves: No cranial nerve deficit.      Sensory: No sensory deficit.   Psychiatric:         Speech: Speech normal.         Behavior: Behavior normal.         Thought Content: Thought content normal.         Judgment: Judgment normal.         Procedures           ED Course                                           MDM  Number of Diagnoses or Management Options  Acute renal insufficiency: new and requires workup  Intractable pain: new and requires workup  Kidney stone on left side: new and requires workup  Diagnosis management comments: Patient appears uncomfortable.  Morphine Toradol will be administered.    Patient's creatinine today has elevated compared to yesterday,  and the patient admits that he has had difficult control pain and decreased oral intake.  I will administer IV fluids and I feel admission for urological evaluation is warranted.    It should be noted the patient attempted to follow-up with urology, Dr. Whitt, but Dr. Whitt per the patient's report is unable to see him until the 28th of this month.    The patient has a proximal stone, 4 mm in size, and his creatinine seems to be elevating in less than a 24-hour period of time.    I discussed the patient with the hospitalist, Dr. Berrios, who will admit.       Amount and/or Complexity of Data Reviewed  Clinical lab tests: ordered and reviewed  Tests in the radiology section of CPT®: reviewed  Decide to obtain previous medical records or to obtain history from someone other than the patient: yes  Obtain history from someone other than the patient: yes  Review and summarize past medical records: yes  Discuss the patient with other providers: yes  Independent visualization of images, tracings, or specimens: yes        Final diagnoses:   Kidney stone on left side   Intractable pain   Acute renal insufficiency       ED Disposition  ED Disposition     ED Disposition Condition Comment    Decision to Admit            No follow-up provider specified.       Medication List      No changes were made to your prescriptions during this visit.          Ольга Pineda MD  06/09/21 4417

## 2021-06-10 NOTE — PLAN OF CARE
Problem: Adult Inpatient Plan of Care  Goal: Plan of Care Review  Outcome: Ongoing, Progressing  Flowsheets (Taken 6/10/2021 1608)  Progress: no change  Plan of Care Reviewed With: patient  Goal: Patient-Specific Goal (Individualized)  Outcome: Ongoing, Progressing  Goal: Absence of Hospital-Acquired Illness or Injury  Outcome: Ongoing, Progressing  Intervention: Identify and Manage Fall Risk  Recent Flowsheet Documentation  Taken 6/10/2021 1600 by Elke Fairchild, RN  Safety Promotion/Fall Prevention:   activity supervised   assistive device/personal items within reach   clutter free environment maintained   fall prevention program maintained   mobility aid in reach   muscle strengthening facilitated   nonskid shoes/slippers when out of bed   room organization consistent   safety round/check completed   toileting scheduled  Taken 6/10/2021 1400 by Elke Fairchild, RN  Safety Promotion/Fall Prevention:   activity supervised   assistive device/personal items within reach   clutter free environment maintained   fall prevention program maintained   mobility aid in reach   muscle strengthening facilitated   nonskid shoes/slippers when out of bed   room organization consistent   safety round/check completed   toileting scheduled  Taken 6/10/2021 1200 by Elke Fairchild, RN  Safety Promotion/Fall Prevention:   activity supervised   assistive device/personal items within reach   clutter free environment maintained   fall prevention program maintained   mobility aid in reach   muscle strengthening facilitated   nonskid shoes/slippers when out of bed   room organization consistent   safety round/check completed   toileting scheduled  Taken 6/10/2021 1000 by Elke Fairchild, RN  Safety Promotion/Fall Prevention:   activity supervised   assistive device/personal items within reach   clutter free environment maintained   fall prevention program maintained   mobility aid in reach   muscle strengthening facilitated   nonskid  shoes/slippers when out of bed   room organization consistent   safety round/check completed   toileting scheduled  Taken 6/10/2021 0800 by Elke Fairchild, RN  Safety Promotion/Fall Prevention:   activity supervised   assistive device/personal items within reach   clutter free environment maintained   fall prevention program maintained   mobility aid in reach   muscle strengthening facilitated   nonskid shoes/slippers when out of bed   room organization consistent   safety round/check completed   toileting scheduled  Intervention: Prevent and Manage VTE (venous thromboembolism) Risk  Recent Flowsheet Documentation  Taken 6/10/2021 0800 by Elke Fairchild, RN  VTE Prevention/Management:   bilateral   dorsiflexion/plantar flexion performed   bleeding risk factor(s) identified  Goal: Optimal Comfort and Wellbeing  Outcome: Ongoing, Progressing  Intervention: Provide Person-Centered Care  Recent Flowsheet Documentation  Taken 6/10/2021 0800 by Elke Fairchild, RN  Trust Relationship/Rapport:   care explained   choices provided  Goal: Readiness for Transition of Care  Outcome: Ongoing, Progressing   Goal Outcome Evaluation:  Plan of Care Reviewed With: patient        Progress: no change

## 2021-06-10 NOTE — PAYOR COMM NOTE
"Victoria Rodriguez RN Utilization Review [phone]  Fax # [phone]    Notification of admission and initial clinicals. Status is inpatient.       South Hoyt (30 y.o. Male)     Date of Birth Social Security Number Address Home Phone MRN    1990  [id number]  [place] [id number] [phone] [id number]    Bapt[employer] Marital Status          Religion Single       Admission Date Admission Type Admitting Provider Attending Provider Department, Room/Bed    21 Emergency Amarjit Villa MD Dossett, Lee M, MD [facility] 2F, S212/    Discharge Date Discharge Disposition Discharge Destination                       Attending Provider: Amarjit Villa MD    Allergies: Shellfish-derived Products    Isolation: None   Infection: None   Code Status: CPR    Ht: 182.9 cm (72\")   Wt: 156 kg (343 lb 6.4 oz)    Admission Cmt: None   Principal Problem: Kidney stone on left side [N20.0]                 Active Insurance as of 2021     Primary Coverage     Payor Plan Insurance Group Employer/Plan Group    [employer]HE[employer] MEDICAID [place] MEDICAID [id number]     Payor Plan Address Payor Plan Phone Number Payor Plan Fax Number Effective Dates    [address] [address] [phone]  2021 - None Entered    [facility] [id number]       Subscriber Name Subscriber Birth Date Member ID       SOUTH HOYT 1990 [id number]                 Emergency Contacts      (Rel.) Home Phone Work Phone Mobile Phone    Padmini Dorsey (Significant Other) [phone] -- --               History & Physical      Lorena Mejia MD at 21 [phone]              [facility] Medicine Services  HISTORY AND PHYSICAL    Patient Name: South Hoyt  : 1990  MRN: [id number]  Primary Care Physician: Provider, No Known  Date of admission: 2021    Subjective   Subjective     Chief Complaint:  Left flank pain     HPI:  South Hoyt is a 30 y.o. " male with a medical history significant for kidney stones who presented to [facility] ED c/o left-sided flank pain. The pain started Monday and then got significantly worse yesterday morning. He denies radiation of the pain. He rates it an 8/10 at its worst. The patient was evaluated in our ED yesterday. CT of the abdomen pelvis demonstrated a 4 mm obstructing stone at the proximal left ureter with mild associated obstructive nephropathy. He was sent home with oral Dilaudid and referral to a urologist. The patient states when he returned home the pain was so bad he was taking the pain medication q3h. He called the urology office today and was told he would be seen in the office no sooner than June 28th. The patient decided to return to the ED for pain control. He reports a history of kidney stone 4 years ago that required a stent and subsequent lithotripsy due to scarring of his ureter. He denies fever or chills. He admits to several episodes of vomiting. No use of tobacco or drugs. Social alcohol use. While in the ED, the patient's vitals were all stable.  Labs revealed creatinine 1.30, WBC 18.86.  Urine positive for leukocytes and trace bacteria.The patient will be admitted to the hospitalist service for further medical management.     COVID Details:        Symptoms: [] NONE [] Fever []  Cough [] Shortness of breath [] Change in taste or smell  The patient qualifies to receive the vaccine, but they have not yet received it.    Review of Systems   Constitutional: Negative for chills, diaphoresis, fatigue and fever.   HENT: Negative for congestion, sore throat and trouble swallowing.    Eyes: Negative for pain and visual disturbance.   Respiratory: Negative for cough, shortness of breath and wheezing.    Cardiovascular: Negative for chest pain, palpitations and leg swelling.   Gastrointestinal: Positive for nausea and vomiting. Negative for abdominal pain, blood in stool, constipation and diarrhea.   Genitourinary:  Positive for decreased urine volume, dysuria and flank pain (left). Negative for difficulty urinating, discharge, enuresis, frequency, genital sores, hematuria, penile pain, penile swelling, scrotal swelling, testicular pain and urgency.   Musculoskeletal: Negative for back pain and gait problem.   Skin: Negative for rash and wound.   Neurological: Negative for dizziness, syncope, speech difficulty, weakness and headaches.   Hematological: Negative for adenopathy. Does not bruise/bleed easily.   Psychiatric/Behavioral: Negative for agitation and confusion.      All other systems reviewed and are negative.     Personal History     Past Medical History:   Diagnosis Date   • Kidney stone    • Renal disorder        Past Surgical History:   Procedure Laterality Date   • CYSTOSCOPY URETEROSCOPY STONE MANIPULATION/EXTRACTION Left 3/29/2017    Procedure: LEFT URETEROSCOPY, LEFT URETERAL DILATION, STENT PLACEMENT;  Surgeon: Niles Lomas MD;  Location: [facility];  Service:    • CYSTOSCOPY URETEROSCOPY STONE MANIPULATION/EXTRACTION Left 4/12/2017    Procedure: LEFT CYSTOSCOPY URETEROSCOPY STONE MANIPULATION/EXTRACTION, LASER LITHOTRIPSY LEFT URETERAL DILATION, REMOVAL OF STENT, PLACEMENT JJ STENT 6X26;  Surgeon: Niles Lomas MD;  Location: [facility];  Service:    • THROAT SURGERY      benign cyst removed at age 5.       Family History: family history is not on file. Otherwise pertinent FHx was reviewed and unremarkable.     Social History:  reports that he has never smoked. He has quit using smokeless tobacco. He reports current alcohol use of about 3.0 standard drinks of alcohol per week. He reports that he does not use drugs.  Social History     Social History Narrative   • Not on file       Medications:  HYDROmorphone, naproxen, ondansetron ODT, and tamsulosin    Allergies   Allergen Reactions   • Shellfish-Derived Products Anaphylaxis       Objective   Objective     Vital Signs:   Temp:  [97 °F (36.1 °C)] 97 °F  (36.1 °C)  Heart Rate:  [87-88] 87  Resp:  [16-18] 16  BP: (133-150)/() 150/93    Physical Exam   Constitutional: Awake, alert, healthy appearing, obese, lying in bed, comfortable    Eyes: PERRLA, sclerae anicteric, no conjunctival injection  HENT: NCAT, mucous membranes moist, face symmetric, dentition normal   Neck: Supple, no thyromegaly, no lymphadenopathy, trachea midline, no meningeal signs   Respiratory: Clear to auscultation bilaterally, no audible wheezes, nonlabored respirations, able to speak in complete sentences   Cardiovascular: RRR, no murmurs appreciated, palpable PT pulses bilaterally  Gastrointestinal: Positive bowel sounds, soft, nontender, no distention, no guarding, no peritoneal signs    Musculoskeletal: No bilateral ankle edema, no clubbing or cyanosis to extremities, no CVA tenderness   Psychiatric: Appropriate affect, cooperative, thought process and content normal   Neurologic: Oriented x 3, moves all extremities, normal tone, strength and sensation symmetric in all extremities, Cranial Nerves grossly intact to confrontation, speech clear  Skin: Cool dry, no rashes, wounds or lesions noted, turgor normal     Result Review:  I have personally reviewed the results from the time of this admission to 06/09/21 10:46 PM EDT and agree with these findings:  [x]  Laboratory  [x]  Microbiology  [x]  Radiology  []  EKG/Telemetry   []  Cardiology/Vascular   []  Pathology  []  Old records  []  Other:  Most notable findings include: See HPI       LAB RESULTS:      Lab 06/09/21 1940 06/08/21  0653   WBC 18.86* 12.97*   HEMOGLOBIN 14.3 14.7   HEMATOCRIT 44.6 45.1   PLATELETS 245 249   NEUTROS ABS 13.35* 6.76   IMMATURE GRANS (ABS) 0.13* 0.20*   LYMPHS ABS 3.86* 4.61*   MONOS ABS 1.40* 0.89   EOS ABS 0.06 0.42*   MCV 83.8 82.9         Lab 06/09/21 1940 06/08/21  0653   SODIUM 140 143   POTASSIUM 4.1 4.1   CHLORIDE 103 107   CO2 25.0 25.0   ANION GAP 12.0 11.0   BUN 22* 15   CREATININE 1.30* 0.88    GLUCOSE 99 138*   CALCIUM 9.2 9.7         Lab 06/09/21  1940 06/08/21  0653   TOTAL PROTEIN 6.5 7.5   ALBUMIN 4.00 4.40   GLOBULIN 2.5 3.1   ALT (SGPT) 41 46*   AST (SGOT) 21 22   BILIRUBIN 0.6 0.9   ALK PHOS 111 138*   LIPASE 19 20                     UA    Urinalysis 6/8/21 6/8/21 6/9/21 6/9/21    0656 0656 2002 2002   Squamous Epithelial Cells, UA 0-2   3-6 (A)   Specific Gravity, UA  1.025 1.037 (A)    Ketones, UA  Negative Negative    Blood, UA  Large (3+) (A) Negative    Leukocytes, UA  Negative Small (1+) (A)    Nitrite, UA  Negative Negative    RBC, UA 13-20 (A)   3-6 (A)   WBC, UA 6-12 (A)   Too Numerous to Count (A)   Bacteria, UA None Seen   Trace   (A) Abnormal value            Microbiology Results (last 10 days)     ** No results found for the last 240 hours. **          CT Abdomen Pelvis Without Contrast    Result Date: 6/8/2021  EXAMINATION: CT ABDOMEN PELVIS WO CONTRAST-  INDICATION: flank pain, h/o stones  TECHNIQUE: Axial noncontrast CT of the abdomen and pelvis with multiplanar reconstruction  The radiation dose reduction device was turned on for each scan per the ALARA (As Low as Reasonably Achievable) protocol.  COMPARISON: 4/14/2017  FINDINGS: The lung bases are grossly clear. Body wall soft tissues are unremarkable. There is no evidence of fracture or aggressive osseous lesion. The liver, spleen, pancreas and bilateral adrenal glands demonstrate homogeneous attenuation without evidence of suspicious focal lesion. The gallbladder is unremarkable. Small and large bowel loops are nondilated. There is no suspicious focal bowel wall thickening. The appendix is unremarkable. No free fluid or pneumoperitoneum. There is a 4 mm calculus noted proximally in the left ureter with mild associated left-sided obstructive nephropathy and minimal hydronephrosis. The right kidney is unremarkable. The pelvic viscera are unremarkable.      Impression: 4 mm obstructing stone at the proximal left ureter with mild  associated obstructive nephropathy.   This report was finalized on 6/8/2021 8:39 AM by Drew Mast.            Assessment/Plan   Assessment & Plan       Kidney stone on left side    Elevated serum creatinine    UTI (urinary tract infection)    Faizan Romero is a 30 y.o. male with a medical history significant for kidney stones who presented to [facility] ED c/o left-sided flank pain.    Kidney stone   Urinary tract infection   Elevated creatinine   -CT of abdomen pelvis noted above  -Blood cultures x 2   -Rocephin  -Supportive care  -IV fluids  -Consult urology   -Avoid nephrotoxins     DVT prophylaxis: SCDs    CODE STATUS:  FULL CODE      This note has been completed as part of a split-shared workflow.   Signature: Electronically signed by Bouchra Rodriguez PA-C, 06/09/21, 11:19 PM EDT      Attending   Admission Attestation       I have seen and examined the patient, performing an independent face-to-face diagnostic evaluation with plan of care reviewed and developed with the advanced practice clinician (APC).      Brief Summary Statement:   30-year-old male with a history of kidney stones in the past requiring a stent who presents with pain.  He initially presented 6/8/2021 with acute onset sharp left flank pain.  He was noted to have a stone at that time with a mild leukocytosis.  His pain improved with pain medications.  Urology was consulted and recommended outpatient work-up.  He was discharged with Zofran, Flomax and oral Dilaudid.  He presents again today due to worsening pain and he felt like he was needing more pain medications.  Patient has been taking Dilaudid, naproxen, Tylenol without improvement in his pain.  Labs on presentation showed an elevated creatinine of 1.3 which is increased from the day prior.  He also has a worsening leukocytosis of 18.8.  UA obtained showed cloudy urine with numerous white blood cell count and trace bacteria which is new.  Go ahead and start patient on IV fluids,  Flomax, pain medications.  We will add on urine culture urology consult in the a.m.  Repeat BMP in the a.m.  N.p.o. at midnight.    Remainder of detailed HPI is as noted by APC and has been reviewed and/or edited by me for completeness.    Attending Physical Exam:  Constitutional: Awake, alert, no acute distress  Eyes: PERRL, sclerae anicteric, no conjunctival injection  HENT: NCAT, mucous membranes moist  Neck: Supple, no thyromegaly, no lymphadenopathy, trachea midline  Respiratory: Clear to auscultation bilaterally, nonlabored respirations   Cardiovascular: RRR, no murmurs, rubs, or gallops, palpable pedal pulses bilaterally  Gastrointestinal: Positive bowel sounds, soft, nontender, nondistended  Musculoskeletal: No bilateral ankle edema, no clubbing or cyanosis to extremities  Psychiatric: Appropriate affect, cooperative  Neurologic: Oriented x 3, no focal neurological deficits  Skin: No rashes      Brief Assessment/Plan :  See detailed assessment and plan developed with APC which I have reviewed and/or edited for completeness.        Admission Status: I believe that this patient meets INPATIENT status due to kidney stone with CA, UTI requiring intervention.  I feel patient’s risk for adverse outcomes and need for care warrant INPATIENT evaluation and I predict the patient’s care encounter to likely last beyond 2 midnights.        Lorena Mejia MD  06/09/21                          Electronically signed by Lorena Mejia MD at 06/09/21 2326          Emergency Department Notes      Ольга Pineda MD at 06/09/21 2201          Subjective   30-year-old male who presents with complaint of left flank pain with associated nausea and vomiting.  The patient was evaluated yesterday morning with his left flank pain identified to have a proximal left 4 mm ureteral stone with associated obstruction.  The patient was discharged with Dilaudid.  He has been taking 1 tablet  2 mg p.o. Dilaudid every 3 hours over  the last day without significant improvement of symptoms.  He also has been taking naproxen and Tylenol in addition to the Dilaudid.  He has now ran out of the Dilaudid that was prescribed to him andhe has return to the ER for further evaluation.  He has a significant history of kidney stones in the past that have required intervention and stenting.  He reports having difficulty passing kidney stones in the past.  He last had intervention for kidney stones approxi-5 years ago by Dr. Fenton, who no longer works in [place].  He denies fever, bodies, chills.  No acute respiratory symptoms include no cough, chest pain, shortness of breath, sore throat, rhinorrhea, change in sense of taste or smell.  He denies change in bowel function.  He has not been diagnosed with COVID-19 and has not been vaccinated with COVID-19.  No other acute complaints.          Review of Systems   Constitutional: Negative for chills, fatigue and fever.   HENT: Negative for congestion, ear pain, postnasal drip, sinus pressure and sore throat.    Eyes: Negative for pain, redness and visual disturbance.   Respiratory: Negative for cough, chest tightness and shortness of breath.    Cardiovascular: Negative for chest pain, palpitations and leg swelling.   Gastrointestinal: Positive for nausea and vomiting. Negative for abdominal pain, anal bleeding, blood in stool and diarrhea.   Endocrine: Negative for polydipsia and polyuria.   Genitourinary: Positive for flank pain. Negative for difficulty urinating, dysuria, frequency and urgency.   Musculoskeletal: Negative for arthralgias, back pain and neck pain.   Skin: Negative for pallor and rash.   Allergic/Immunologic: Negative for environmental allergies and immunocompromised state.   Neurological: Negative for dizziness, weakness and headaches.   Hematological: Negative for adenopathy.   Psychiatric/Behavioral: Negative for confusion, self-injury and suicidal ideas. The patient is not nervous/anxious.     All other systems reviewed and are negative.      Past Medical History:   Diagnosis Date   • Kidney stone    • Renal disorder        Allergies   Allergen Reactions   • Shellfish-Derived Products Anaphylaxis       Past Surgical History:   Procedure Laterality Date   • CYSTOSCOPY URETEROSCOPY STONE MANIPULATION/EXTRACTION Left 3/29/2017    Procedure: LEFT URETEROSCOPY, LEFT URETERAL DILATION, STENT PLACEMENT;  Surgeon: Niles Lomas MD;  Location: [facility] OR;  Service:    • CYSTOSCOPY URETEROSCOPY STONE MANIPULATION/EXTRACTION Left 4/12/2017    Procedure: LEFT CYSTOSCOPY URETEROSCOPY STONE MANIPULATION/EXTRACTION, LASER LITHOTRIPSY LEFT URETERAL DILATION, REMOVAL OF STENT, PLACEMENT JJ STENT 6X26;  Surgeon: Niles Lomas MD;  Location: [facility] OR;  Service:    • THROAT SURGERY      benign cyst removed at age 5.       History reviewed. No pertinent family history.    Social History     Socioeconomic History   • Marital status: Single     Spouse name: Not on file   • Number of children: Not on file   • Years of education: Not on file   • Highest education level: Not on file   Tobacco Use   • Smoking status: Never Smoker   • Smokeless tobacco: Former User   Substance and Sexual Activity   • Alcohol use: Yes     Alcohol/week: 3.0 standard drinks     Types: 3 Cans of beer per week     Comment: pt drinks once a week   • Drug use: No   • Sexual activity: Defer           Objective   Physical Exam  Vitals and nursing note reviewed.   Constitutional:       General: He is not in acute distress.     Appearance: Normal appearance. He is well-developed. He is ill-appearing. He is not toxic-appearing or diaphoretic.   HENT:      Head: Normocephalic and atraumatic.      Right Ear: External ear normal.      Left Ear: External ear normal.      Nose: Nose normal.   Eyes:      General: Lids are normal.      Pupils: Pupils are equal, round, and reactive to light.   Neck:      Trachea: No tracheal deviation.   Cardiovascular:       Rate and Rhythm: Normal rate and regular rhythm.      Pulses: No decreased pulses.      Heart sounds: Normal heart sounds. No murmur heard.   No friction rub. No gallop.    Pulmonary:      Effort: Pulmonary effort is normal. No respiratory distress.      Breath sounds: Normal breath sounds. No decreased breath sounds, wheezing, rhonchi or rales.   Abdominal:      General: Bowel sounds are normal.      Palpations: Abdomen is soft.      Tenderness: There is no abdominal tenderness. There is no guarding or rebound.   Musculoskeletal:         General: No deformity. Normal range of motion.      Cervical back: Normal range of motion and neck supple.   Lymphadenopathy:      Cervical: No cervical adenopathy.   Skin:     General: Skin is warm and dry.      Findings: No rash.   Neurological:      Mental Status: He is alert and oriented to person, place, and time.      Cranial Nerves: No cranial nerve deficit.      Sensory: No sensory deficit.   Psychiatric:         Speech: Speech normal.         Behavior: Behavior normal.         Thought Content: Thought content normal.         Judgment: Judgment normal.         Procedures          ED Course                                           MDM  Number of Diagnoses or Management Options  Acute renal insufficiency: new and requires workup  Intractable pain: new and requires workup  Kidney stone on left side: new and requires workup  Diagnosis management comments: Patient appears uncomfortable.  Morphine Toradol will be administered.    Patient's creatinine today has elevated compared to yesterday, and the patient admits that he has had difficult control pain and decreased oral intake.  I will administer IV fluids and I feel admission for urological evaluation is warranted.    It should be noted the patient attempted to follow-up with urology, Dr. Whitt, but Dr. Whitt per the patient's report is unable to see him until the 28th of this month.    The patient has a proximal stone, 4 mm  in size, and his creatinine seems to be elevating in less than a 24-hour period of time.    I discussed the patient with the hospitalist, Dr. Berrios, who will admit.       Amount and/or Complexity of Data Reviewed  Clinical lab tests: ordered and reviewed  Tests in the radiology section of CPT®: reviewed  Decide to obtain previous medical records or to obtain history from someone other than the patient: yes  Obtain history from someone other than the patient: yes  Review and summarize past medical records: yes  Discuss the patient with other providers: yes  Independent visualization of images, tracings, or specimens: yes        Final diagnoses:   Kidney stone on left side   Intractable pain   Acute renal insufficiency       ED Disposition  ED Disposition     ED Disposition Condition Comment    Decision to Admit            No follow-up provider specified.       Medication List      No changes were made to your prescriptions during this visit.          Ольга Pineda MD  06/09/21 2207      Electronically signed by Ольга Pineda MD at 06/09/21 2207     Yolanda Brody, RN at 06/09/21 2244        Report to Yolanda Bird RN, RN  06/09/21 2244      Electronically signed by Yolanda Brody RN at 06/09/21 2244       Vital Signs (last day)     Date/Time   Temp   Temp src   Pulse   Resp   BP   Patient Position   SpO2    06/10/21 1130   97.8 (36.6)   Oral   68   18   145/77   Lying   95    06/10/21 1100   --   --   58   --   142/87   --   91    06/10/21 0930   --   --   70   --   127/74   --   96    06/10/21 0900   --   --   71   --   134/64   --   94    06/10/21 0830   --   --   74   --   143/75   --   96    06/10/21 0800   --   --   70   --   143/89   --   96    06/10/21 0738   --   --   --   --   147/100   --   --    06/10/21 0732   97.7 (36.5)   Oral   75   18   (!) 159/113   Lying   100    06/10/21 0500   --   --   69   --   --   --   --    06/10/21 0400   --   --   69   --   --   --   --    06/10/21  0332   98.1 (36.7)   Oral   77   18   118/71   Lying   97    06/10/21 0300   --   --   75   --   --   --   93    06/10/21 0200   --   --   77   --   --   --   --    06/10/21 0100   --   --   92   --   --   --   --    06/10/21 0000   --   --   84   --   --   --   --    06/09/21 2311   98.3 (36.8)   Oral   84   18   141/89   Lying   100    06/09/21 2130   --   --   --   --   150/93   --   100    06/09/21 2100   --   --   87   16   133/100   Lying   98    06/09/21 1917   97 (36.1)   Oral   88   18   (!) 147/110   Sitting   98                Current Facility-Administered Medications   Medication Dose Route Frequency Provider Last Rate Last Admin   • acetaminophen (TYLENOL) tablet 650 mg  650 mg Oral Q4H PRN Bouchra Rodriguez PA-C        Or   • acetaminophen (TYLENOL) 160 MG/5ML solution 650 mg  650 mg Oral Q4H PRN Bouchra Rodriguez PA-C        Or   • acetaminophen (TYLENOL) suppository 650 mg  650 mg Rectal Q4H PRN Bouchra Rodriguez PA-C       • cefTRIAXone (ROCEPHIN) 1 g/100 mL 0.9% NS (MBP)  1 g Intravenous Q24H Bouchra Rodriguez PA-C   1 g at 06/09/21 2340   • HYDROcodone-acetaminophen (NORCO) 5-325 MG per tablet 1 tablet  1 tablet Oral Q6H PRN Lorena Mejia MD   1 tablet at 06/10/21 0655   • HYDROmorphone (DILAUDID) injection 0.5 mg  0.5 mg Intravenous Q3H PRN Shannon Hale R, APRN   0.5 mg at 06/10/21 1333   • ketorolac (TORADOL) injection 30 mg  30 mg Intravenous Q6H PRN Shannon Hale R, APRN       • melatonin tablet 5 mg  5 mg Oral Nightly PRN Bouchra Rodriguez PA-C       • ondansetron (ZOFRAN) tablet 4 mg  4 mg Oral Q6H PRN Bouchra Rodriguez PA-C        Or   • ondansetron (ZOFRAN) injection 4 mg  4 mg Intravenous Q6H PRN Bouchra Rodriguez PA-C       • Sodium Chloride (PF) 0.9 % 10 mL  10 mL Intravenous PRN Ольга Pineda MD       • sodium chloride 0.9 % flush 10 mL  10 mL Intravenous Q12H Bouchra Rodriguez PA-C       • sodium chloride 0.9 % flush 10 mL  10 mL Intravenous PRN Bouchra Rodriguez PA-C       •  sodium chloride 0.9 % with KCl 20 mEq/L infusion  75 mL/hr Intravenous Continuous Shannon Hale APRN 75 mL/hr at 06/10/21 1216 75 mL/hr at 06/10/21 1216   • tamsulosin (FLOMAX) 24 hr capsule 0.4 mg  0.4 mg Oral Nightly Bouchra Rodriguez PA-C   0.4 mg at 06/09/21 2338     Orders (active)      Start     Ordered    06/11/21 0600  Basic Metabolic Panel  Morning Draw      06/10/21 1247    06/10/21 1245  sodium chloride 0.9 % with KCl 20 mEq/L infusion  Continuous      06/10/21 1154    06/10/21 1154  HYDROmorphone (DILAUDID) injection 0.5 mg  Every 3 Hours PRN      06/10/21 1154    06/10/21 1154  ketorolac (TORADOL) injection 30 mg  Every 6 Hours PRN      06/10/21 1154    06/10/21 [id number]  Inpatient Urology Consult  [place]      Specialty:  Urology  Provider:  Tyrone Garza Jr., MD    06/09/21 2322    06/10/21 0015  tamsulosin (FLOMAX) 24 hr capsule 0.4 mg  Nightly      06/09/21 2322    06/10/21 0015  sodium chloride 0.9 % flush 10 mL  Every 12 Hours Scheduled      06/09/21 2322    06/10/21 0001  NPO Diet  Diet Effective Midnight      06/09/21 2322    06/10/21 0000  Vital Signs  Every 4 Hours      06/09/21 2322    06/10/21 0000  Strict Intake & Output  Every Hour      06/09/21 2322    06/10/21 0000  cefTRIAXone (ROCEPHIN) 1 g/100 mL 0.9% NS (MBP)  Every 24 Hours Scheduled      06/09/21 2322 06/09/21 2323  Blood Culture - Blood, Arm, Right  Once      06/09/21 2322 06/09/21 2323  Blood Culture - Blood, Hand, Right  Once     Comments: From a different site than #1.      06/09/21 2322 06/09/21 2322  HYDROcodone-acetaminophen (NORCO) 5-325 MG per tablet 1 tablet  Every 6 Hours PRN      06/09/21 2323    06/09/21 2321  ondansetron (ZOFRAN) tablet 4 mg  Every 6 Hours PRN      06/09/21 2322 06/09/21 2321  ondansetron (ZOFRAN) injection 4 mg  Every 6 Hours PRN      06/09/21 2322 06/09/21 2321  melatonin tablet 5 mg  Nightly PRN      06/09/21 2322 06/09/21 2321  acetaminophen (TYLENOL) tablet 650 mg  Every 4  Hours PRN      06/09/21 2322    06/09/21 2321  acetaminophen (TYLENOL) 160 MG/5ML solution 650 mg  Every 4 Hours PRN      06/09/21 2322    06/09/21 2321  acetaminophen (TYLENOL) suppository 650 mg  Every 4 Hours PRN      06/09/21 2322    06/09/21 2321  Intake & Output  Every Shift      06/09/21 2322    06/09/21 2321  Weigh Patient  Once      06/09/21 2322    06/09/21 2321  Oxygen Therapy- Nasal Cannula; Titrate for SPO2: 90% - 95%  Continuous      06/09/21 2322    06/09/21 2321  Insert Peripheral IV  Once      06/09/21 2322    06/09/21 2321  Saline Lock & Maintain IV Access  Continuous      06/09/21 2322 06/09/21 2321  Code Status and Medical Interventions:  Continuous      06/09/21 2322    06/09/21 2321  Maintain Sequential Compression Device  Continuous      06/09/21 2322    06/09/21 2321  Cardiac Monitoring  Continuous      06/09/21 2322 06/09/21 2321  Pulse Oximetry, Continuous  Continuous      06/09/21 2322    06/09/21 2321  Activity - Ad Sandy  Until Discontinued      06/09/21 2322    06/09/21 2321  Fall Precautions  Continuous      06/09/21 2322 06/09/21 2320  sodium chloride 0.9 % flush 10 mL  As Needed      06/09/21 2322 06/09/21 2300  Opioid Administration - Capnography (EtCO2) Monitoring  Per Order Details      06/09/21 2300    06/09/21 2300  Opioid Administration - Document EtCO2 Value With Each Set of Vitals & Any Change in Patient Status  Per Order Details      06/09/21 2300    06/09/21 2300  Opioid Administration - Notify Provider Capnography (EtCO2)  Until Discontinued      06/09/21 2300 06/09/21 1939  Insert Peripheral IV  Once      06/09/21 1939 06/09/21 1938  Sodium Chloride (PF) 0.9 % 10 mL  As Needed      06/09/21 1939                Operative/Procedure Notes (all)    No notes of this type exist for this encounter.            Physician Progress Notes (all)      Shannon Hale, APRN at 06/10/21 1226              [facility] Medicine Services  PROGRESS  NOTE    Patient Name: Faizan Romero  : 1990  MRN: [id number]    Date of Admission: 2021  Primary Care Physician: Provider, No Known    Subjective   Subjective     CC:  Left Flank Pain    HPI:  Patient reports being comfortable right now. Pain waxes and wanes. No n/v, f/c/s or voiding issue.    ROS:  Gen- No fevers, chills  CV- No chest pain, palpitations  Resp- No cough, dyspnea  GI- No N/V/D, + abd pain/ flank pain        Objective   Objective     Vital Signs:   Temp:  [97 °F (36.1 °C)-98.3 °F (36.8 °C)] 97.8 °F (36.6 °C)  Heart Rate:  [58-92] 68  Resp:  [16-18] 18  BP: (118-159)/() 145/77        Physical Exam:  Constitutional: No acute distress, awake, alert, lying in bed  HENT: NCAT, mucous membranes moist  Respiratory: Clear to auscultation bilaterally, respiratory effort normal   Cardiovascular: RRR, no murmurs, rubs, or gallops  Gastrointestinal: Positive bowel sounds, soft, nontender, nondistended  + left flank tenderness  Musculoskeletal: No bilateral ankle edema  Psychiatric: Appropriate affect, cooperative  Neurologic: Oriented x 3, strength symmetric in all extremities, Cranial Nerves grossly intact to confrontation, speech clear  Skin: No rashes      Results Reviewed:  Results from last 7 days   Lab Units 06/10/21  0820 06/09/21  1940 06/08/21  0653   WBC 10*3/mm3 9.95 18.86* 12.97*   HEMOGLOBIN g/dL 12.2* 14.3 14.7   HEMATOCRIT % 38.0 44.6 45.1   PLATELETS 10*3/mm3 195 245 249     Results from last 7 days   Lab Units 06/10/21  0821  0653   SODIUM mmol/L 142 140 143   POTASSIUM mmol/L 4.0 4.1 4.1   CHLORIDE mmol/L 108* 103 107   CO2 mmol/L 24.0 25.0 25.0   BUN mg/dL 22* 22* 15   CREATININE mg/dL 0.99 1.30* 0.88   GLUCOSE mg/dL 94 99 138*   CALCIUM mg/dL 8.3* 9.2 9.7   ALT (SGPT) U/L  --  41 46*   AST (SGOT) U/L  --  21 22     Estimated Creatinine Clearance: 171.3 mL/min (by C-G formula based on SCr of 0.99 mg/dL).    Microbiology Results Abnormal      Procedure Component Value - Date/Time    COVID PRE-OP / PRE-PROCEDURE SCREENING ORDER (NO ISOLATION) - Swab, Nasopharynx [[id number]]  (Normal) Collected: 06/09/21 2214    Lab Status: Final result Specimen: Swab from Nasopharynx Updated: 06/09/21 2246    Narrative:      The following orders were created for panel order COVID PRE-OP / PRE-PROCEDURE SCREENING ORDER (NO ISOLATION) - Swab, Nasopharynx.  Procedure                               Abnormality         Status                     ---------                               -----------         ------                     COVID-19 and FLU A/B PCR...[[id number]]  Normal              Final result                 Please view results for these tests on the individual orders.    COVID-19 and FLU A/B PCR - Swab, Nasopharynx [[id number]]  (Normal) Collected: 06/09/21 2214    Lab Status: Final result Specimen: Swab from Nasopharynx Updated: 06/09/21 2246     COVID19 Not Detected     Influenza A PCR Not Detected     Influenza B PCR Not Detected    Narrative:      Fact sheet for providers: https://www.fda.gov/media/049043/download    Fact sheet for patients: https://www.fda.gov/media/947731/download    Test performed by PCR.          Imaging Results (Last 24 Hours)     ** No results found for the last 24 hours. **              I have reviewed the medications:  Scheduled Meds:cefTRIAXone, 1 g, Intravenous, Q24H  sodium chloride, 10 mL, Intravenous, Q12H  tamsulosin, 0.4 mg, Oral, Nightly      Continuous Infusions:sodium chloride 0.9 % with KCl 20 mEq, 75 mL/hr, Last Rate: 75 mL/hr (06/10/21 1216)      PRN Meds:.•  acetaminophen **OR** acetaminophen **OR** acetaminophen  •  HYDROcodone-acetaminophen  •  HYDROmorphone  •  ketorolac  •  melatonin  •  ondansetron **OR** ondansetron  •  Sodium Chloride (PF)  •  sodium chloride    Assessment/Plan   Assessment & Plan     Active Hospital Problems    Diagnosis  POA   • **Kidney stone on left side [N20.0]  Yes   • Elevated serum creatinine  [R79.89]  Yes   • UTI (urinary tract infection) [N39.0]  Yes      Resolved Hospital Problems   No resolved problems to display.        Brief Hospital Course to date:  Faizan Romero is a 30 y.o. male with pmh significant for kidney stones presents with left sided flank pain, found to have 4mm obstructing stone in left ureter:    Left ureterolithiasis  UTI  -- continue Rocephin D2  -- urology consulted. NPO until eval  -- Ucx and blood cx pending  -- change IVFs to NS + 20K  -- pain and nausea control. Will change to dilaudid q 3 hr and add toradol prn  --continue flomax    CA  -- resolving with IVFs, monitor      DVT Prophylaxis:  mechanical      Disposition: I expect the patient to be discharged home when cleared by urology    CODE STATUS:   Code Status and Medical Interventions:   Ordered at: 06/09/21 2322     Level Of Support Discussed With:    Patient     Code Status:    CPR     Medical Interventions (Level of Support Prior to Arrest):    Full       KRISTAL Biggs  06/10/21                Electronically signed by Shannon Hale APRN at 06/10/21 1246     MORPHINE SULFATE (PF) INJECTION 4 MG (Once)    Action Due Given*           Reason             Time Entered 06/09/21 2131 06/09/21 2210*           User  Yolanda Brody RN*           Second User             Time Scheduled 06/09/21 2133 06/09/21 2133           Admin Time 06/09/21 2133 06/09/21 2209*           Dose(unit)   4 mg*           Route  Intravenous*           Site             Comment             Infusion Rate(unit)               Duration(unit)                      KETOROLAC (TORADOL) INJECTION 15 MG (Once)    Action Due Given*           Reason             Time Entered 06/09/21 2131 06/09/21 2210*           User  Yolanda Brody, JOSLYN*           Second User             Time Scheduled 06/09/21 2133 06/09/21 2133           Admin Time 06/09/21 2133 06/09/21 2209*           Dose(unit)   15 mg*           Route  Intravenous*           Site              Comment             Infusion Rate(unit)               Duration(unit)                        MORPHINE INJECTION 1 MG (Every 4 Hours PRN)    Action Due Given*           Reason             Time Entered 06/10/21 0404 06/10/21 0406*           User  Deirdre Lerner RN*           Second User             Time Scheduled 06/10/21 0404 06/10/21 0404           Admin Time 06/10/21 0404 06/10/21 0405*           Dose(unit)   1 mg*           Route  Intravenous*           Site             Comment             Infusion Rate(unit)               Duration(unit)                      Action Due Given*           Reason             Time Entered 06/10/21 0732 06/10/21 0734*           User  Elke Fairchild RN*           Second User             Time Scheduled 06/10/21 0732 06/10/21 0732           Admin Time 06/10/21 0732 06/10/21 0734*           Dose(unit)   1 mg*           Route  Intravenous*           Site             Comment             Infusion Rate(unit)               Duration(unit)                      Action Due Return to Cabinet*           Reason             Time Entered 06/10/21 1111 06/10/21 1202*           User  Elke Fairchild RN*           Second User             Time Scheduled 06/10/21 1111 06/10/21 1111           Admin Time 06/10/21 1111 06/10/21 1202*           Dose(unit)               Route  Intravenous*           Site             Comment             Infusion Rate(unit)               Duration(unit)           HYDROMORPHONE (DILAUDID) INJECTION 0.5 MG (Every 3 Hours PRN)    Action Due Given*           Reason             Time Entered 06/10/21 1331 06/10/21 1333*           User  Elke Fairchild RN*           Second User             Time Scheduled 06/10/21 1331 06/10/21 1331           Admin Time 06/10/21 1331 06/10/21 1333*           Dose(unit)   0.5 mg*           Route  Intravenous*           Site             Comment             Infusion Rate(unit)               Duration(unit)             Consult Notes (all)    No notes of this type  exist for this encounter.

## 2021-06-10 NOTE — PLAN OF CARE
Problem: Adult Inpatient Plan of Care  Goal: Plan of Care Review  Outcome: Ongoing, Progressing  Goal: Patient-Specific Goal (Individualized)  Outcome: Ongoing, Progressing  Goal: Absence of Hospital-Acquired Illness or Injury  Outcome: Ongoing, Progressing  Intervention: Identify and Manage Fall Risk  Recent Flowsheet Documentation  Taken 6/9/2021 2328 by Deirdre Lerner RN  Safety Promotion/Fall Prevention:   activity supervised   assistive device/personal items within reach   clutter free environment maintained   fall prevention program maintained   nonskid shoes/slippers when out of bed   room organization consistent   safety round/check completed  Intervention: Prevent Skin Injury  Recent Flowsheet Documentation  Taken 6/9/2021 2328 by Deirdre Lerner RN  Body Position: position changed independently  Goal: Optimal Comfort and Wellbeing  Outcome: Ongoing, Progressing  Goal: Readiness for Transition of Care  Outcome: Ongoing, Progressing  Intervention: Mutually Develop Transition Plan  Recent Flowsheet Documentation  Taken 6/9/2021 2301 by Deirdre Lerner RN  Transportation Anticipated:   car, drives self   family or friend will provide  Patient/Family Anticipated Services at Transition: none  Patient/Family Anticipates Transition to: home with family  Taken 6/9/2021 2300 by Deirdre Lerner RN  Equipment Currently Used at Home: none     Problem: Fall Injury Risk  Goal: Absence of Fall and Fall-Related Injury  Outcome: Ongoing, Progressing  Intervention: Promote Injury-Free Environment  Recent Flowsheet Documentation  Taken 6/9/2021 2328 by Deirdre Lerner RN  Safety Promotion/Fall Prevention:   activity supervised   assistive device/personal items within reach   clutter free environment maintained   fall prevention program maintained   nonskid shoes/slippers when out of bed   room organization consistent   safety round/check completed   Goal Outcome Evaluation:            Pt. Admitted to floor, no c/o pain currently, IV  morphine given in ED, pt. Has no current complaints, will continue to monitor.

## 2021-06-11 VITALS
HEIGHT: 72 IN | WEIGHT: 315 LBS | TEMPERATURE: 97.9 F | RESPIRATION RATE: 14 BRPM | OXYGEN SATURATION: 96 % | DIASTOLIC BLOOD PRESSURE: 79 MMHG | SYSTOLIC BLOOD PRESSURE: 141 MMHG | BODY MASS INDEX: 42.66 KG/M2 | HEART RATE: 81 BPM

## 2021-06-11 PROBLEM — R79.89 ELEVATED SERUM CREATININE: Status: RESOLVED | Noted: 2021-06-09 | Resolved: 2021-06-11

## 2021-06-11 LAB
ANION GAP SERPL CALCULATED.3IONS-SCNC: 14 MMOL/L (ref 5–15)
BACTERIA SPEC AEROBE CULT: NO GROWTH
BUN SERPL-MCNC: 16 MG/DL (ref 6–20)
BUN/CREAT SERPL: 21.9 (ref 7–25)
CALCIUM SPEC-SCNC: 9.1 MG/DL (ref 8.6–10.5)
CHLORIDE SERPL-SCNC: 104 MMOL/L (ref 98–107)
CO2 SERPL-SCNC: 19 MMOL/L (ref 22–29)
CREAT SERPL-MCNC: 0.73 MG/DL (ref 0.76–1.27)
GFR SERPL CREATININE-BSD FRML MDRD: 128 ML/MIN/1.73
GLUCOSE SERPL-MCNC: 123 MG/DL (ref 65–99)
POTASSIUM SERPL-SCNC: 4.3 MMOL/L (ref 3.5–5.2)
SODIUM SERPL-SCNC: 137 MMOL/L (ref 136–145)

## 2021-06-11 PROCEDURE — 80048 BASIC METABOLIC PNL TOTAL CA: CPT | Performed by: UROLOGY

## 2021-06-11 PROCEDURE — 99239 HOSP IP/OBS DSCHRG MGMT >30: CPT | Performed by: NURSE PRACTITIONER

## 2021-06-11 RX ORDER — HYDROCODONE BITARTRATE AND ACETAMINOPHEN 5; 325 MG/1; MG/1
1 TABLET ORAL EVERY 6 HOURS PRN
Qty: 12 TABLET | Refills: 0 | Status: SHIPPED | OUTPATIENT
Start: 2021-06-11 | End: 2021-06-14

## 2021-06-11 RX ORDER — CEFUROXIME AXETIL 250 MG/1
500 TABLET ORAL EVERY 12 HOURS SCHEDULED
Status: DISCONTINUED | OUTPATIENT
Start: 2021-06-11 | End: 2021-06-11 | Stop reason: HOSPADM

## 2021-06-11 RX ORDER — CEFUROXIME AXETIL 500 MG/1
500 TABLET ORAL EVERY 12 HOURS SCHEDULED
Qty: 6 TABLET | Refills: 0 | Status: SHIPPED | OUTPATIENT
Start: 2021-06-11 | End: 2021-06-14

## 2021-06-11 RX ADMIN — CEFUROXIME AXETIL 500 MG: 250 TABLET ORAL at 10:44

## 2021-06-11 NOTE — ADDENDUM NOTE
Addendum  created 06/10/21 2045 by Rafael Solis MD    Child order released for a procedure order, Clinical Note Signed, Intraprocedure Blocks edited, LDA created via procedure documentation, LDA updated via procedure documentation

## 2021-06-11 NOTE — ANESTHESIA POSTPROCEDURE EVALUATION
Patient: Faizan Romero    Procedure Summary     Date: 06/10/21 Room / [facility]:  [facility]    Anesthesia Start: 1934 Anesthesia Stop: 2019    Procedure: CYSTOSCOPY LEFT URETEROSCOPY, LASER LITHOTRIPSY, STENT PLCEMENT (Left ) Diagnosis:     Surgeons: Tyrone Garza Jr., MD Provider: Rafael Solis MD    Anesthesia Type: general ASA Status: 3          Anesthesia Type: general    Vitals  No vitals data found for the desired time range.          Post Anesthesia Care and Evaluation    Patient location during evaluation: PACU  Patient participation: complete - patient participated  Level of consciousness: awake and alert  Pain management: adequate  Airway patency: patent  Anesthetic complications: No anesthetic complications  PONV Status: none  Cardiovascular status: hemodynamically stable and acceptable  Respiratory status: nonlabored ventilation, acceptable and nasal cannula  Hydration status: acceptable

## 2021-06-11 NOTE — ANESTHESIA PROCEDURE NOTES
Airway  Urgency: elective    Date/Time: 6/10/2021 5:41 PM  Airway not difficult    General Information and Staff    Patient location during procedure: OR  Anesthesiologist: Rafael Solis MD    Indications and Patient Condition  Indications for airway management: airway protection    Preoxygenated: yes  Mask difficulty assessment: 1 - vent by mask    Final Airway Details  Final airway type: supraglottic airway      Successful airway: I-gel  Size 5    Number of attempts at approach: 1  Assessment: lips, teeth, and gum same as pre-op    Additional Comments  LMA placed without difficulty, ventilation with assist, equal breath sounds and symmetric chest rise and fall

## 2021-06-11 NOTE — PLAN OF CARE
Goal Outcome Evaluation:  Plan of Care Reviewed With: patient, spouse        Progress: improving  Outcome Summary: Patient no longer in pain, will follow up outpatient.

## 2021-06-11 NOTE — PLAN OF CARE
Problem: Adult Inpatient Plan of Care  Goal: Plan of Care Review  Outcome: Ongoing, Progressing  Goal: Patient-Specific Goal (Individualized)  Outcome: Ongoing, Progressing  Goal: Absence of Hospital-Acquired Illness or Injury  Outcome: Ongoing, Progressing  Intervention: Identify and Manage Fall Risk  Recent Flowsheet Documentation  Taken 6/10/2021 2119 by Deirdre Lerner, RN  Safety Promotion/Fall Prevention:   activity supervised   assistive device/personal items within reach   clutter free environment maintained   fall prevention program maintained   nonskid shoes/slippers when out of bed   room organization consistent   safety round/check completed  Intervention: Prevent Skin Injury  Recent Flowsheet Documentation  Taken 6/10/2021 2119 by Deirdre Lerner RN  Body Position: position changed independently  Goal: Optimal Comfort and Wellbeing  Outcome: Ongoing, Progressing  Goal: Readiness for Transition of Care  Outcome: Ongoing, Progressing     Problem: Fall Injury Risk  Goal: Absence of Fall and Fall-Related Injury  Outcome: Ongoing, Progressing  Intervention: Promote Injury-Free Environment  Recent Flowsheet Documentation  Taken 6/10/2021 2119 by Deirdre Lerner RN  Safety Promotion/Fall Prevention:   activity supervised   assistive device/personal items within reach   clutter free environment maintained   fall prevention program maintained   nonskid shoes/slippers when out of bed   room organization consistent   safety round/check completed   Goal Outcome Evaluation:      VSS on RA, pt. Received back following urinary stent placement, pt. C/o pain controlled with prns, no other complaints, pt. Voided bloody urine, will continue to monitor.

## 2021-06-11 NOTE — DISCHARGE SUMMARY
[facility] Medicine Services  DISCHARGE SUMMARY    Patient Name: Faizan Romero  : 1990  MRN: [id number]    Date of Admission: 2021  8:41 PM  Date of Discharge:  2021  Primary Care Physician: Provider, No Known    Consults     Date and Time Order Name Status Description    6/10/2021 12:35 AM Inpatient Urology Consult Completed           Hospital Course     Presenting Problem:   Kidney stone on left side [N20.0]    Active Hospital Problems    Diagnosis  POA   • **Kidney stone on left side [N20.0]  Yes   • UTI (urinary tract infection) [N39.0]  Yes      Resolved Hospital Problems    Diagnosis Date Resolved POA   • Elevated serum creatinine [R79.89] 2021 Yes          Hospital Course:  Faizan Romero is a 30 y.o. male with pmh significant for kidney stones presents with left flank pain, found to have 4mm obstructing stone in left ureter and CA. Started on Rocephin for UTI, IVFs and urology consulted. CA resolved with IVFs. Urology placed left ureteral stent 6/10 with sheath placement in left ureter for dilation. Plan to follow up with Dr. hernandez next week for reevaluation and KUB. Continue on flomax and ceftin at discharge.       Discharge Follow Up Recommendations for outpatient labs/diagnostics:  Follow up with Dr. Garza in 1 week with KUB  PCP follow up ~ 2 weeks/ prn        Day of Discharge     HPI:   Comfortable currently. Pain continues with urination, but voiding well. No n/v/d. No f/c/s    Review of Systems  Gen- No fevers, chills  CV- No chest pain, palpitations  Resp- No cough, dyspnea  GI- No N/V/D, abd pain    Vital Signs:   Temp:  [97 °F (36.1 °C)-98.2 °F (36.8 °C)] 97.9 °F (36.6 °C)  Heart Rate:  [58-95] 81  Resp:  [14-18] 14  BP: (114-158)/(74-98) 141/79     Physical Exam:  Constitutional: No acute distress, awake, alert  HENT: NCAT, mucous membranes moist  Respiratory: Clear to auscultation bilaterally, respiratory effort normal    Cardiovascular: RRR, no murmurs, rubs, or gallops  Gastrointestinal: Positive bowel sounds, soft, nontender, nondistended, morbidly obese   Musculoskeletal: No bilateral ankle edema  Psychiatric: Appropriate affect, cooperative  Neurologic: Oriented x 3, strength symmetric in all extremities, Cranial Nerves grossly intact to confrontation, speech clear  Skin: No rashes      Pertinent  and/or Most Recent Results     LAB RESULTS:      Lab 06/10/21  0820 06/09/21  2346 06/09/21 1940 06/08/21  0653   WBC 9.95  --  18.86* 12.97*   HEMOGLOBIN 12.2*  --  14.3 14.7   HEMATOCRIT 38.0  --  44.6 45.1   PLATELETS 195  --  245 249   NEUTROS ABS  --   --  13.35* 6.76   IMMATURE GRANS (ABS)  --   --  0.13* 0.20*   LYMPHS ABS  --   --  3.86* 4.61*   MONOS ABS  --   --  1.40* 0.89   EOS ABS  --   --  0.06 0.42*   MCV 85.2  --  83.8 82.9   LACTATE  --  0.8  --   --          Lab 06/11/21  0528 06/10/21  0820 06/09/21 1940 06/08/21  0653   SODIUM 137 142 140 143   POTASSIUM 4.3 4.0 4.1 4.1   CHLORIDE 104 108* 103 107   CO2 19.0* 24.0 25.0 25.0   ANION GAP 14.0 10.0 12.0 11.0   BUN 16 22* 22* 15   CREATININE 0.73* 0.99 1.30* 0.88   GLUCOSE 123* 94 99 138*   CALCIUM 9.1 8.3* 9.2 9.7         Lab 06/09/21 1940 06/08/21  0653   TOTAL PROTEIN 6.5 7.5   ALBUMIN 4.00 4.40   GLOBULIN 2.5 3.1   ALT (SGPT) 41 46*   AST (SGOT) 21 22   BILIRUBIN 0.6 0.9   ALK PHOS 111 138*   LIPASE 19 20                     Brief Urine Lab Results  (Last result in the past 365 days)      Color   Clarity   Blood   Leuk Est   Nitrite   Protein   CREAT   Urine HCG        06/09/21 2002 Yellow Cloudy Negative Small (1+) Negative Trace             Microbiology Results (last 10 days)     Procedure Component Value - Date/Time    Blood Culture - Blood, Hand, Right [[id number]] Collected: 06/09/21 1794    Lab Status: Preliminary result Specimen: Blood from Hand, Right Updated: 06/11/21 0116     Blood Culture No growth at 24 hours    Blood Culture - Blood, Arm, Right  [[id number]] Collected: 06/09/21 2346    Lab Status: Preliminary result Specimen: Blood from Arm, Right Updated: 06/11/21 0101     Blood Culture No growth at 24 hours    COVID PRE-OP / PRE-PROCEDURE SCREENING ORDER (NO ISOLATION) - Swab, Nasopharynx [[id number]]  (Normal) Collected: 06/09/21 2214    Lab Status: Final result Specimen: Swab from Nasopharynx Updated: 06/09/21 2246    Narrative:      The following orders were created for panel order COVID PRE-OP / PRE-PROCEDURE SCREENING ORDER (NO ISOLATION) - Swab, Nasopharynx.  Procedure                               Abnormality         Status                     ---------                               -----------         ------                     COVID-19 and FLU A/B PCR...[[id number]]  Normal              Final result                 Please view results for these tests on the individual orders.    COVID-19 and FLU A/B PCR - Swab, Nasopharynx [[id number]]  (Normal) Collected: 06/09/21 2214    Lab Status: Final result Specimen: Swab from Nasopharynx Updated: 06/09/21 2246     COVID19 Not Detected     Influenza A PCR Not Detected     Influenza B PCR Not Detected    Narrative:      Fact sheet for providers: https://www.fda.gov/media/890033/download    Fact sheet for patients: https://www.fda.gov/media/660851/download    Test performed by PCR.          CT Abdomen Pelvis Without Contrast    Result Date: 6/8/2021  EXAMINATION: CT ABDOMEN PELVIS WO CONTRAST-  INDICATION: flank pain, h/o stones  TECHNIQUE: Axial noncontrast CT of the abdomen and pelvis with multiplanar reconstruction  The radiation dose reduction device was turned on for each scan per the ALARA (As Low as Reasonably Achievable) protocol.  COMPARISON: 4/14/2017  FINDINGS: The lung bases are grossly clear. Body wall soft tissues are unremarkable. There is no evidence of fracture or aggressive osseous lesion. The liver, spleen, pancreas and bilateral adrenal glands demonstrate homogeneous attenuation without  evidence of suspicious focal lesion. The gallbladder is unremarkable. Small and large bowel loops are nondilated. There is no suspicious focal bowel wall thickening. The appendix is unremarkable. No free fluid or pneumoperitoneum. There is a 4 mm calculus noted proximally in the left ureter with mild associated left-sided obstructive nephropathy and minimal hydronephrosis. The right kidney is unremarkable. The pelvic viscera are unremarkable.      4 mm obstructing stone at the proximal left ureter with mild associated obstructive nephropathy.   This report was finalized on 6/8/2021 8:39 AM by Drew Mast.      FL C Arm During Surgery    Result Date: 6/11/2021  EXAMINATION: FLUOROSCOPY C-ARM DURING SURGERY-  INDICATION: Cystoscopy; N20.0-Calculus of kidney; R52-Pain, unspecified; N28.9-Disorder of kidney and ureter, unspecified.  COMPARISON: None.      FINDINGS/IMPRESSION: Three minutes of fluoroscopy and one image for cystography.  D:  06/11/2021 E:  06/11/2021                       Plan for Follow-up of Pending Labs/Results:  Pending Labs     Order Current Status    Urine Culture - Urine, Urine, Clean Catch In process    Blood Culture - Blood, Arm, Right Preliminary result    Blood Culture - Blood, Hand, Right Preliminary result        Discharge Details        Discharge Medications      New Medications      Instructions Start Date   cefuroxime 500 MG tablet  Commonly known as: CEFTIN   500 mg, Oral, Every 12 Hours Scheduled      HYDROcodone-acetaminophen 5-325 MG per tablet  Commonly known as: NORCO   1 tablet, Oral, Every 6 Hours PRN         Continue These Medications      Instructions Start Date   naproxen 250 MG tablet  Commonly known as: NAPROSYN   250 mg, Oral, 2 Times Daily With Meals      ondansetron ODT 4 MG disintegrating tablet  Commonly known as: ZOFRAN-ODT   4 mg, Translingual, Every 8 Hours PRN      tamsulosin 0.4 MG capsule 24 hr capsule  Commonly known as: FLOMAX   0.4 mg, Oral, Nightly, Until  pain is gone or the kidney stone passes         Stop These Medications    HYDROmorphone 2 MG tablet  Commonly known as: DILAUDID            Allergies   Allergen Reactions   • Shellfish-Derived Products Anaphylaxis         Discharge Disposition:  Home or Self Care    Diet:  Hospital:  Diet Order   Procedures   • Diet Regular       Activity:  Activity Instructions     Activity as Tolerated            Restrictions or Other Recommendations:         CODE STATUS:    Code Status and Medical Interventions:   Ordered at: 06/09/21 1934     Level Of Support Discussed With:    Patient     Code Status:    CPR     Medical Interventions (Level of Support Prior to Arrest):    Full       No future appointments.    Additional Instructions for the Follow-ups that You Need to Schedule     Discharge Follow-up with PCP   As directed       Currently Documented PCP:    Provider, No Known    PCP Phone Number:    None     Follow Up Details: 2 weeks/ prn         Discharge Follow-up with Specified Provider: dr. martinez; 1 Week   As directed      To: dr. martinez    Follow Up: 1 Week    Follow Up Details: KRISTAL Sloan  06/11/21      Time Spent on Discharge:  I spent  34  minutes on this discharge activity which included: face-to-face encounter with the patient, reviewing the data in the system, coordination of the care with the nursing staff as well as consultants, documentation, and entering orders.        Electronically signed by KRISTAL Biggs, 06/11/21, 9:14 AM EDT.

## 2021-06-11 NOTE — OP NOTE
CYSTOSCOPY URETEROSCOPY  Procedure Note    Faizan Romero  6/10/2021    Pre-op Diagnosis:   Left ureteral stone    Post-op Diagnosis:     Left ureteral stone    Procedure/CPT® Codes:      Procedure(s):  CYSTOSCOPY LEFT URETEROSCOPY,  STENT PLaCEMENT 6 x 26 no string    Surgeon(s):  Tyrone Garza Jr., MD    Anesthesia: General    Staff:   Circulator: Jason Price RN  Laser Staff: Sherri Caldwell RN  Radiology Technologist: Carlos Berry  Scrstephy Person: Luis Schmidt  Other: Noemí Avila, JOSLYN      Was needed to assist in this case with retraction, exposure, instrument placement.    Estimated Blood Loss: minimal  Urine Voided: * No values recorded between 6/10/2021  7:34 PM and 6/10/2021  8:10 PM *    Specimens:                None      Drains:   Ureteral Drain/Stent Left ureter (Active)       Findings: Extremely narrow ureteral lumen especially proximally we were unable to ultimately pass the ureteroscope up to the level of the stone.  Good position of ureteral stent for passive dilation.    Complications: None    History: 30-year-old gentleman with proximal left ureteral stone.  He gives his consent for ureteroscopy and potential laser lithotripsy and stent placement.  He understands the risk benefits therein.    Operative Report: After consent was obtained the patient was taken to the operating suite was placed in supine position.  Anesthesia was induced.  He is carefully placed in dorsolithotomy position padding all pressure points.  He sterilely prepped and draped in normal fashion.  Scope inserted the patient return bladder atraumatically.  There are no stones within the urinary bladder.  Wire was advanced up into the ureteral orifice on the left side and into the renal pelvis beyond the radiopaque proximal ureteral stone.  Alongside the wire we placed a rigid ureteroscope and were only able to perform ureteroscopy into the very distal ureter.  At this point a 10 coaxial dilator was used to  dilate the ureteral orifice and 2 wire access was obtained.  We then dilated the distal ureter with ureteral access sheath up to 13 Welsh.  Ureter fluoroscopy was then performed with a flexible ureteroscope up into the proximal ureter however the lumen of the ureter was extremely narrow proximally without any stricture.  In the distal ureter we did see a stricture which was dilated easily with the 11\13 access sheath.  We were unable to pass the ureteroscope safely to the level of the ureteral stone.  We confirmed that our wire was beyond the stone and removed our flexible ureteroscope.  Double-J ureteral stent 6 x 26 was placed with a good curl in the renal pelvis proximal to the radiopaque stone and good curl in the bladder.  No string was left in place.  Bladder was emptied.  Anesthesia reversed.  Plan will be for future disposition of the stone once passive dilation of the ureter has been undertaken.    Tyrone Garza Jr., MD     Date: 6/10/2021  Time: 20:10 EDT

## 2021-06-11 NOTE — CASE MANAGEMENT/SOCIAL WORK
Case Management Discharge Note      Final Note: Plan is home with family. Wife will transport. Patient denies any discharge needs.         Selected Continued Care - Admitted Since 6/9/2021     Destination    No services have been selected for the patient.              Durable Medical Equipment    No services have been selected for the patient.              Dialysis/Infusion    No services have been selected for the patient.              Home Medical Care    No services have been selected for the patient.              Therapy    No services have been selected for the patient.              Community Resources    No services have been selected for the patient.              Community & DME    No services have been selected for the patient.                       Final Discharge Disposition Code: 01 - home or self-care

## 2021-06-15 LAB
BACTERIA SPEC AEROBE CULT: NORMAL
BACTERIA SPEC AEROBE CULT: NORMAL

## 2021-06-15 NOTE — PAYOR COMM NOTE
"Victoria Rodriguez, RN Utilization Review [phone]  Fax # [phone]  Ref # [id number]    Please note discharge date       [place] (30 y.o. Male)     Date of Birth Social Security Number Address Home Phone MRN    1990  [address]  [place] [id number] [phone] [id number]    Congregation Marital Status          Presybeterian Single       Admission Date Admission Type Admitting Provider Attending Provider Department, Room/Bed    21 Emergency Amarjit Villa MD  [facility] 2F, S212    Discharge Date Discharge Disposition Discharge Destination        2021 Home or Self Care              Attending Provider: (none)   Allergies: Shellfish-derived Products    Isolation: None   Infection: None   Code Status: Prior    Ht: 182.9 cm (72\")   Wt: 156 kg (343 lb 6.4 oz)    Admission Cmt: None   Principal Problem: Kidney stone on left side [N20.0]                 Active Insurance as of 2021     Primary Coverage     Payor Plan Insurance Group Employer/Plan Group    [employer] MEDICAID [facility] MEDICAID [id number]     Payor Plan Address Payor Plan Phone Number Payor Plan Fax Number Effective Dates    [address] [address] [phone]  2021 - None Entered    [facility] [id number]       Subscriber Name Subscriber Birth Date Member ID       SOUTH HOYT 1990 [id number]                 Emergency Contacts      (Rel.) Home Phone Work Phone Mobile Phone    Padmini Dorsey (Significant Other) [phone] -- --               Discharge Summary      Shannon Hale APRN at 21 0914     Attestation signed by Amarjit Villa MD at 21 0944    I have reviewed this documentation and agree.                        [facility] Medicine Services  DISCHARGE SUMMARY    Patient Name: [place]  : 1990  MRN: [id number]    Date of Admission: 2021  8:41 PM  Date of Discharge:  2021  Primary Care " Physician: Provider, No Known    Consults     Date and Time Order Name Status Description    6/10/2021 12:35 AM Inpatient Urology Consult Completed           Hospital Course     Presenting Problem:   Kidney stone on left side [N20.0]    Active Hospital Problems    Diagnosis  POA   • **Kidney stone on left side [N20.0]  Yes   • UTI (urinary tract infection) [N39.0]  Yes      Resolved Hospital Problems    Diagnosis Date Resolved POA   • Elevated serum creatinine [R79.89] 06/11/2021 Yes          Hospital Course:  Faizan Romero is a 30 y.o. male with pmh significant for kidney stones presents with left flank pain, found to have 4mm obstructing stone in left ureter and CA. Started on Rocephin for UTI, IVFs and urology consulted. CA resolved with IVFs. Urology placed left ureteral stent 6/10 with sheath placement in left ureter for dilation. Plan to follow up with Dr. hernandez next week for reevaluation and KUB. Continue on flomax and ceftin at discharge.       Discharge Follow Up Recommendations for outpatient labs/diagnostics:  Follow up with Dr. Garza in 1 week with KUB  PCP follow up ~ 2 weeks/ prn        Day of Discharge     HPI:   Comfortable currently. Pain continues with urination, but voiding well. No n/v/d. No f/c/s    Review of Systems  Gen- No fevers, chills  CV- No chest pain, palpitations  Resp- No cough, dyspnea  GI- No N/V/D, abd pain    Vital Signs:   Temp:  [97 °F (36.1 °C)-98.2 °F (36.8 °C)] 97.9 °F (36.6 °C)  Heart Rate:  [58-95] 81  Resp:  [14-18] 14  BP: (114-158)/(74-98) 141/79     Physical Exam:  Constitutional: No acute distress, awake, alert  HENT: NCAT, mucous membranes moist  Respiratory: Clear to auscultation bilaterally, respiratory effort normal   Cardiovascular: RRR, no murmurs, rubs, or gallops  Gastrointestinal: Positive bowel sounds, soft, nontender, nondistended, morbidly obese   Musculoskeletal: No bilateral ankle edema  Psychiatric: Appropriate affect,  cooperative  Neurologic: Oriented x 3, strength symmetric in all extremities, Cranial Nerves grossly intact to confrontation, speech clear  Skin: No rashes      Pertinent  and/or Most Recent Results     LAB RESULTS:      Lab 06/10/21  0820 06/09/21  2346 06/09/21 1940 06/08/21  0653   WBC 9.95  --  18.86* 12.97*   HEMOGLOBIN 12.2*  --  14.3 14.7   HEMATOCRIT 38.0  --  44.6 45.1   PLATELETS 195  --  245 249   NEUTROS ABS  --   --  13.35* 6.76   IMMATURE GRANS (ABS)  --   --  0.13* 0.20*   LYMPHS ABS  --   --  3.86* 4.61*   MONOS ABS  --   --  1.40* 0.89   EOS ABS  --   --  0.06 0.42*   MCV 85.2  --  83.8 82.9   LACTATE  --  0.8  --   --          Lab 06/11/21  0528 06/10/21  0820 06/09/21  1940 06/08/21  0653   SODIUM 137 142 140 143   POTASSIUM 4.3 4.0 4.1 4.1   CHLORIDE 104 108* 103 107   CO2 19.0* 24.0 25.0 25.0   ANION GAP 14.0 10.0 12.0 11.0   BUN 16 22* 22* 15   CREATININE 0.73* 0.99 1.30* 0.88   GLUCOSE 123* 94 99 138*   CALCIUM 9.1 8.3* 9.2 9.7         Lab 06/09/21 1940 06/08/21  0653   TOTAL PROTEIN 6.5 7.5   ALBUMIN 4.00 4.40   GLOBULIN 2.5 3.1   ALT (SGPT) 41 46*   AST (SGOT) 21 22   BILIRUBIN 0.6 0.9   ALK PHOS 111 138*   LIPASE 19 20                     Brief Urine Lab Results  (Last result in the past 365 days)      Color   Clarity   Blood   Leuk Est   Nitrite   Protein   CREAT   Urine HCG        06/09/21 2002 Yellow Cloudy Negative Small (1+) Negative Trace             Microbiology Results (last 10 days)     Procedure Component Value - Date/Time    Blood Culture - Blood, Hand, Right [[id number]] Collected: 06/09/21 2350    Lab Status: Preliminary result Specimen: Blood from Hand, Right Updated: 06/11/21 0116     Blood Culture No growth at 24 hours    Blood Culture - Blood, Arm, Right [[id number]] Collected: 06/09/21 5966    Lab Status: Preliminary result Specimen: Blood from Arm, Right Updated: 06/11/21 0101     Blood Culture No growth at 24 hours    COVID PRE-OP / PRE-PROCEDURE SCREENING ORDER (NO  ISOLATION) - Swab, Nasopharynx [[id number]]  (Normal) Collected: 06/09/21 2214    Lab Status: Final result Specimen: Swab from Nasopharynx Updated: 06/09/21 2246    Narrative:      The following orders were created for panel order COVID PRE-OP / PRE-PROCEDURE SCREENING ORDER (NO ISOLATION) - Swab, Nasopharynx.  Procedure                               Abnormality         Status                     ---------                               -----------         ------                     COVID-19 and FLU A/B PCR...[[id number]]  Normal              Final result                 Please view results for these tests on the individual orders.    COVID-19 and FLU A/B PCR - Swab, Nasopharynx [[id number]]  (Normal) Collected: 06/09/21 2214    Lab Status: Final result Specimen: Swab from Nasopharynx Updated: 06/09/21 2246     COVID19 Not Detected     Influenza A PCR Not Detected     Influenza B PCR Not Detected    Narrative:      Fact sheet for providers: https://www.fda.gov/media/719854/download    Fact sheet for patients: https://www.fda.gov/media/179044/download    Test performed by PCR.          CT Abdomen Pelvis Without Contrast    Result Date: 6/8/2021  EXAMINATION: CT ABDOMEN PELVIS WO CONTRAST-  INDICATION: flank pain, h/o stones  TECHNIQUE: Axial noncontrast CT of the abdomen and pelvis with multiplanar reconstruction  The radiation dose reduction device was turned on for each scan per the ALARA (As Low as Reasonably Achievable) protocol.  COMPARISON: 4/14/2017  FINDINGS: The lung bases are grossly clear. Body wall soft tissues are unremarkable. There is no evidence of fracture or aggressive osseous lesion. The liver, spleen, pancreas and bilateral adrenal glands demonstrate homogeneous attenuation without evidence of suspicious focal lesion. The gallbladder is unremarkable. Small and large bowel loops are nondilated. There is no suspicious focal bowel wall thickening. The appendix is unremarkable. No free fluid or  pneumoperitoneum. There is a 4 mm calculus noted proximally in the left ureter with mild associated left-sided obstructive nephropathy and minimal hydronephrosis. The right kidney is unremarkable. The pelvic viscera are unremarkable.      4 mm obstructing stone at the proximal left ureter with mild associated obstructive nephropathy.   This report was finalized on 6/8/2021 8:39 AM by Drew Mast.      FL C Arm During Surgery    Result Date: 6/11/2021  EXAMINATION: FLUOROSCOPY C-ARM DURING SURGERY-  INDICATION: Cystoscopy; N20.0-Calculus of kidney; R52-Pain, unspecified; N28.9-Disorder of kidney and ureter, unspecified.  COMPARISON: None.      FINDINGS/IMPRESSION: Three minutes of fluoroscopy and one image for cystography.  D:  06/11/2021 E:  06/11/2021                       Plan for Follow-up of Pending Labs/Results:  Pending Labs     Order Current Status    Urine Culture - Urine, Urine, Clean Catch In process    Blood Culture - Blood, Arm, Right Preliminary result    Blood Culture - Blood, Hand, Right Preliminary result        Discharge Details        Discharge Medications      New Medications      Instructions Start Date   cefuroxime 500 MG tablet  Commonly known as: CEFTIN   500 mg, Oral, Every 12 Hours Scheduled      HYDROcodone-acetaminophen 5-325 MG per tablet  Commonly known as: NORCO   1 tablet, Oral, Every 6 Hours PRN         Continue These Medications      Instructions Start Date   naproxen 250 MG tablet  Commonly known as: NAPROSYN   250 mg, Oral, 2 Times Daily With Meals      ondansetron ODT 4 MG disintegrating tablet  Commonly known as: ZOFRAN-ODT   4 mg, Translingual, Every 8 Hours PRN      tamsulosin 0.4 MG capsule 24 hr capsule  Commonly known as: FLOMAX   0.4 mg, Oral, Nightly, Until pain is gone or the kidney stone passes         Stop These Medications    HYDROmorphone 2 MG tablet  Commonly known as: DILAUDID            Allergies   Allergen Reactions   • Shellfish-Derived Products Anaphylaxis          Discharge Disposition:  Home or Self Care    Diet:  Hospital:  Diet Order   Procedures   • Diet Regular       Activity:  Activity Instructions     Activity as Tolerated            Restrictions or Other Recommendations:         CODE STATUS:    Code Status and Medical Interventions:   Ordered at: 06/09/21 9272     Level Of Support Discussed With:    Patient     Code Status:    CPR     Medical Interventions (Level of Support Prior to Arrest):    Full       No future appointments.    Additional Instructions for the Follow-ups that You Need to Schedule     Discharge Follow-up with PCP   As directed       Currently Documented PCP:    Provider, No Known    PCP Phone Number:    None     Follow Up Details: 2 weeks/ prn         Discharge Follow-up with Specified Provider: dr. martinez; 1 Week   As directed      To: dr. martinez    Follow Up: 1 Week    Follow Up Details: KRISTAL Sloan  06/11/21      Time Spent on Discharge:  I spent  34  minutes on this discharge activity which included: face-to-face encounter with the patient, reviewing the data in the system, coordination of the care with the nursing staff as well as consultants, documentation, and entering orders.        Electronically signed by KRISTAL Biggs, 06/11/21, 9:14 AM EDT.      Electronically signed by Amarjit Villa MD at 06/11/21 0944       Discharge Order (From admission, onward)     Start     Ordered    06/11/21 0913  Discharge patient  Once     Expected Discharge Date: 06/11/21    Discharge Disposition: Home or Self Care    Physician of Record for Attribution - Please select from Treatment Team: MIGUEL A MARTINEZ JR [[id number]]    Review needed by CMO to determine Physician of Record: No       Question Answer Comment   Physician of Record for Attribution - Please select from Treatment Team MIGUEL A MARTINEZ JR    Review needed by CMO to determine Physician of Record No        06/11/21 0914

## 2021-10-27 ENCOUNTER — HOSPITAL ENCOUNTER (EMERGENCY)
Facility: HOSPITAL | Age: 31
Discharge: HOME | End: 2021-10-27
Attending: EMERGENCY MEDICINE | Admitting: EMERGENCY MEDICINE

## 2021-10-27 ENCOUNTER — APPOINTMENT (OUTPATIENT)
Dept: CT IMAGING | Facility: HOSPITAL | Age: 31
End: 2021-10-27

## 2021-10-27 VITALS
SYSTOLIC BLOOD PRESSURE: 175 MMHG | TEMPERATURE: 97.9 F | RESPIRATION RATE: 18 BRPM | BODY MASS INDEX: 42.66 KG/M2 | HEART RATE: 77 BPM | WEIGHT: 315 LBS | OXYGEN SATURATION: 99 % | HEIGHT: 72 IN | DIASTOLIC BLOOD PRESSURE: 108 MMHG

## 2021-10-27 DIAGNOSIS — R10.9 ACUTE LEFT FLANK PAIN: ICD-10-CM

## 2021-10-27 DIAGNOSIS — N20.0 KIDNEY STONE ON LEFT SIDE: Primary | ICD-10-CM

## 2021-10-27 LAB
ALBUMIN SERPL-MCNC: 4.3 G/DL (ref 3.5–5.2)
ALBUMIN/GLOB SERPL: 1.6 G/DL
ALP SERPL-CCNC: 132 U/L (ref 39–117)
ALT SERPL W P-5'-P-CCNC: 44 U/L (ref 1–41)
ANION GAP SERPL CALCULATED.3IONS-SCNC: 12 MMOL/L (ref 5–15)
AST SERPL-CCNC: 24 U/L (ref 1–40)
BASOPHILS # BLD AUTO: 0.09 10*3/MM3 (ref 0–0.2)
BASOPHILS NFR BLD AUTO: 0.6 % (ref 0–1.5)
BILIRUB SERPL-MCNC: 0.5 MG/DL (ref 0–1.2)
BILIRUB UR QL STRIP: NEGATIVE
BUN SERPL-MCNC: 13 MG/DL (ref 6–20)
BUN/CREAT SERPL: 17.3 (ref 7–25)
CALCIUM SPEC-SCNC: 9.2 MG/DL (ref 8.6–10.5)
CHLORIDE SERPL-SCNC: 105 MMOL/L (ref 98–107)
CLARITY UR: CLEAR
CO2 SERPL-SCNC: 24 MMOL/L (ref 22–29)
COLOR UR: YELLOW
CREAT SERPL-MCNC: 0.75 MG/DL (ref 0.76–1.27)
DEPRECATED RDW RBC AUTO: 39.3 FL (ref 37–54)
EOSINOPHIL # BLD AUTO: 0.38 10*3/MM3 (ref 0–0.4)
EOSINOPHIL NFR BLD AUTO: 2.5 % (ref 0.3–6.2)
ERYTHROCYTE [DISTWIDTH] IN BLOOD BY AUTOMATED COUNT: 13.1 % (ref 12.3–15.4)
GFR SERPL CREATININE-BSD FRML MDRD: 123 ML/MIN/1.73
GLOBULIN UR ELPH-MCNC: 2.7 GM/DL
GLUCOSE SERPL-MCNC: 109 MG/DL (ref 65–99)
GLUCOSE UR STRIP-MCNC: NEGATIVE MG/DL
HCT VFR BLD AUTO: 43.8 % (ref 37.5–51)
HGB BLD-MCNC: 14.4 G/DL (ref 13–17.7)
HGB UR QL STRIP.AUTO: NEGATIVE
HOLD SPECIMEN: NORMAL
IMM GRANULOCYTES # BLD AUTO: 0.17 10*3/MM3 (ref 0–0.05)
IMM GRANULOCYTES NFR BLD AUTO: 1.1 % (ref 0–0.5)
KETONES UR QL STRIP: ABNORMAL
LEUKOCYTE ESTERASE UR QL STRIP.AUTO: NEGATIVE
LIPASE SERPL-CCNC: 18 U/L (ref 13–60)
LYMPHOCYTES # BLD AUTO: 3.24 10*3/MM3 (ref 0.7–3.1)
LYMPHOCYTES NFR BLD AUTO: 21.5 % (ref 19.6–45.3)
MCH RBC QN AUTO: 27.4 PG (ref 26.6–33)
MCHC RBC AUTO-ENTMCNC: 32.9 G/DL (ref 31.5–35.7)
MCV RBC AUTO: 83.4 FL (ref 79–97)
MONOCYTES # BLD AUTO: 0.8 10*3/MM3 (ref 0.1–0.9)
MONOCYTES NFR BLD AUTO: 5.3 % (ref 5–12)
NEUTROPHILS NFR BLD AUTO: 10.4 10*3/MM3 (ref 1.7–7)
NEUTROPHILS NFR BLD AUTO: 69 % (ref 42.7–76)
NITRITE UR QL STRIP: NEGATIVE
NRBC BLD AUTO-RTO: 0 /100 WBC (ref 0–0.2)
PH UR STRIP.AUTO: 6.5 [PH] (ref 5–8)
PLATELET # BLD AUTO: 252 10*3/MM3 (ref 140–450)
PMV BLD AUTO: 10.6 FL (ref 6–12)
POTASSIUM SERPL-SCNC: 4.2 MMOL/L (ref 3.5–5.2)
PROT SERPL-MCNC: 7 G/DL (ref 6–8.5)
PROT UR QL STRIP: NEGATIVE
RBC # BLD AUTO: 5.25 10*6/MM3 (ref 4.14–5.8)
SODIUM SERPL-SCNC: 141 MMOL/L (ref 136–145)
SP GR UR STRIP: 1.03 (ref 1–1.03)
UROBILINOGEN UR QL STRIP: ABNORMAL
WBC # BLD AUTO: 15.08 10*3/MM3 (ref 3.4–10.8)
WHOLE BLOOD HOLD SPECIMEN: NORMAL
WHOLE BLOOD HOLD SPECIMEN: NORMAL

## 2021-10-27 PROCEDURE — 81003 URINALYSIS AUTO W/O SCOPE: CPT

## 2021-10-27 PROCEDURE — 80053 COMPREHEN METABOLIC PANEL: CPT | Performed by: EMERGENCY MEDICINE

## 2021-10-27 PROCEDURE — 83690 ASSAY OF LIPASE: CPT | Performed by: EMERGENCY MEDICINE

## 2021-10-27 PROCEDURE — 99283 EMERGENCY DEPT VISIT LOW MDM: CPT

## 2021-10-27 PROCEDURE — 85025 COMPLETE CBC W/AUTO DIFF WBC: CPT

## 2021-10-27 PROCEDURE — 74176 CT ABD & PELVIS W/O CONTRAST: CPT

## 2021-10-27 RX ORDER — OXYCODONE AND ACETAMINOPHEN 7.5; 325 MG/1; MG/1
1 TABLET ORAL ONCE
Status: COMPLETED | OUTPATIENT
Start: 2021-10-27 | End: 2021-10-27

## 2021-10-27 RX ORDER — OXYCODONE AND ACETAMINOPHEN 7.5; 325 MG/1; MG/1
1 TABLET ORAL EVERY 6 HOURS PRN
Qty: 12 TABLET | Refills: 0 | Status: SHIPPED | OUTPATIENT
Start: 2021-10-27 | End: 2021-10-30

## 2021-10-27 RX ORDER — SODIUM CHLORIDE 9 MG/ML
10 INJECTION INTRAVENOUS AS NEEDED
Status: DISCONTINUED | OUTPATIENT
Start: 2021-10-27 | End: 2021-10-28 | Stop reason: HOSPADM

## 2021-10-27 RX ORDER — KETOROLAC TROMETHAMINE 10 MG/1
10 TABLET, FILM COATED ORAL EVERY 6 HOURS PRN
Qty: 15 TABLET | Refills: 0 | Status: SHIPPED | OUTPATIENT
Start: 2021-10-27 | End: 2021-11-01

## 2021-10-27 RX ORDER — ONDANSETRON 4 MG/1
4 TABLET, ORALLY DISINTEGRATING ORAL EVERY 6 HOURS PRN
Qty: 20 TABLET | Refills: 0 | Status: SHIPPED | OUTPATIENT
Start: 2021-10-27 | End: 2021-11-01

## 2021-10-27 RX ADMIN — SODIUM CHLORIDE 1000 ML: 9 INJECTION, SOLUTION INTRAVENOUS at 19:57

## 2021-10-27 RX ADMIN — OXYCODONE HYDROCHLORIDE AND ACETAMINOPHEN 1 TABLET: 7.5; 325 TABLET ORAL at 23:39

## 2021-11-01 ENCOUNTER — OFFICE VISIT (OUTPATIENT)
Dept: UROLOGY | Facility: CLINIC | Age: 31
End: 2021-11-01

## 2021-11-01 VITALS
BODY MASS INDEX: 42.66 KG/M2 | WEIGHT: 315 LBS | DIASTOLIC BLOOD PRESSURE: 84 MMHG | OXYGEN SATURATION: 98 % | HEART RATE: 81 BPM | HEIGHT: 72 IN | SYSTOLIC BLOOD PRESSURE: 132 MMHG

## 2021-11-01 DIAGNOSIS — N20.0 KIDNEY STONE: Primary | ICD-10-CM

## 2021-11-01 LAB
BILIRUB BLD-MCNC: NEGATIVE MG/DL
CLARITY, POC: CLEAR
COLOR UR: YELLOW
EXPIRATION DATE: ABNORMAL
GLUCOSE UR STRIP-MCNC: NEGATIVE MG/DL
KETONES UR QL: NEGATIVE
LEUKOCYTE EST, POC: NEGATIVE
Lab: ABNORMAL
NITRITE UR-MCNC: NEGATIVE MG/ML
PH UR: 7.5 [PH] (ref 5–8)
PROT UR STRIP-MCNC: NEGATIVE MG/DL
RBC # UR STRIP: NEGATIVE /UL
SP GR UR: 1.02 (ref 1–1.03)
UROBILINOGEN UR QL: ABNORMAL

## 2021-11-01 PROCEDURE — 99204 OFFICE O/P NEW MOD 45 MIN: CPT | Performed by: UROLOGY

## 2021-11-01 RX ORDER — OXYCODONE AND ACETAMINOPHEN 7.5; 325 MG/1; MG/1
1 TABLET ORAL EVERY 6 HOURS PRN
COMMUNITY

## 2021-11-01 NOTE — PROGRESS NOTES
Office Note Kidney Stone      Patient Name: Faizan Romero  : 1990   MRN: [id number]     Chief Complaint: History of Kidney Stones.    Chief Complaint   Patient presents with   • Nephrolithiasis       Referring Provider: Ольга Pineda MD    History of Present Illness: Faizan Romero is a 31 y.o. male who presents today for history of nephrolithiasis.  The patient has had multiple prior stone episodes last treated by Dr. Tyrone Garza in 2021.  He was supposed to follow-up with his urologist but was unable to as this urologist did not take his insurance plan per report.  The patient presented to the [facility] ED 10/27/2021 with acute onset of left flank pain.  CT scan demonstrated approximately 6 mm mid ureteral stone with hydronephrosis.  The patient was discharged and told to contact his urologist but was unable to get into clinic with him due to insurance reasons and therefore presents to our clinic today.  He reports that his flank pain has improved since his ED visit.  He is unsure if he passed any stone fragment.  Denies dysuria or hematuria at this time.  Denies fever chills nausea, emesis.        Subjective      Review of System: Review of Systems   Constitutional: Negative for chills, fatigue, fever and unexpected weight change.   HENT: Negative for sore throat.    Eyes: Negative for visual disturbance.   Respiratory: Negative for cough, chest tightness and shortness of breath.    Cardiovascular: Negative for chest pain and leg swelling.   Gastrointestinal: Positive for diarrhea. Negative for blood in stool, constipation, nausea, rectal pain and vomiting.   Genitourinary: Positive for difficulty urinating and dysuria. Negative for decreased urine volume, enuresis, flank pain, frequency, genital sores, hematuria and urgency.   Musculoskeletal: Positive for back pain. Negative for joint swelling.   Skin: Negative for rash and wound.   Neurological: Negative for  seizures, speech difficulty, weakness and headaches.   Psychiatric/Behavioral: Negative for confusion, sleep disturbance and suicidal ideas. The patient is not nervous/anxious.       I have reviewed the ROS documented by my clinical staff, updated as appropriate and I agree. Dwight Cartagena MD    Past Medical History:   Past Medical History:   Diagnosis Date   • Kidney stone    • Renal disorder        Past Surgical History:   Past Surgical History:   Procedure Laterality Date   • CYSTOSCOPY URETEROSCOPY Left 6/10/2021    Procedure: CYSTOSCOPY LEFT URETEROSCOPY, STENT PLCEMENT;  Surgeon: Tyrone Garza Jr., MD;  Location: [facility] OR;  Service: Urology;  Laterality: Left;   • CYSTOSCOPY URETEROSCOPY STONE MANIPULATION/EXTRACTION Left 3/29/2017    Procedure: LEFT URETEROSCOPY, LEFT URETERAL DILATION, STENT PLACEMENT;  Surgeon: Niles Lomas MD;  Location: [facility] OR;  Service:    • CYSTOSCOPY URETEROSCOPY STONE MANIPULATION/EXTRACTION Left 4/12/2017    Procedure: LEFT CYSTOSCOPY URETEROSCOPY STONE MANIPULATION/EXTRACTION, LASER LITHOTRIPSY LEFT URETERAL DILATION, REMOVAL OF STENT, PLACEMENT JJ STENT 6X26;  Surgeon: Niles Lomas MD;  Location: [facility] OR;  Service:    • THROAT SURGERY      benign cyst removed at age 5.       Family History: History reviewed. No pertinent family history.    Social History:   Social History     Socioeconomic History   • Marital status: Single   Tobacco Use   • Smoking status: Never Smoker   • Smokeless tobacco: Former User   Substance and Sexual Activity   • Alcohol use: Yes     Alcohol/week: 3.0 standard drinks     Types: 3 Cans of beer per week     Comment: pt drinks once a week   • Drug use: No   • Sexual activity: Defer       Medications:     Current Outpatient Medications:   •  oxyCODONE-acetaminophen (PERCOCET) 7.5-325 MG per tablet, Take 1 tablet by mouth Every 6 (Six) Hours As Needed., Disp: , Rfl:   •  ketorolac (TORADOL) 10 MG tablet, Take 1 tablet by mouth Every 6  "(Six) Hours As Needed for Moderate Pain  for up to 5 days., Disp: 15 tablet, Rfl: 0  •  naproxen (NAPROSYN) 250 MG tablet, Take 1 tablet by mouth 2 (Two) Times a Day With Meals., Disp: 24 tablet, Rfl: 0  •  ondansetron ODT (ZOFRAN-ODT) 4 MG disintegrating tablet, Place 1 tablet on the tongue Every 8 (Eight) Hours As Needed for Nausea., Disp: 15 tablet, Rfl: 0  •  ondansetron ODT (ZOFRAN-ODT) 4 MG disintegrating tablet, Place 1 tablet on the tongue Every 6 (Six) Hours As Needed for Nausea for up to 5 days., Disp: 20 tablet, Rfl: 0  •  tamsulosin (FLOMAX) 0.4 MG capsule 24 hr capsule, Take 1 capsule by mouth Every Night. Until pain is gone or the kidney stone passes, Disp: 15 capsule, Rfl: 0    Allergies:   Allergies   Allergen Reactions   • Shellfish-Derived Products Anaphylaxis       Objective     Physical Exam:   Vital Signs:   Vitals:    11/01/21 1456   BP: 132/84   Pulse: 81   SpO2: 98%   Weight: (!) 145 kg (320 lb)   Height: 182.9 cm (72.01\")   PainSc:   2     Body mass index is 43.39 kg/m².     Physical Exam  Vitals and nursing note reviewed.   Constitutional:       Appearance: Normal appearance. He is normal weight.   Cardiovascular:      Comments: Well-perfused  Pulmonary:      Effort: Pulmonary effort is normal.   Abdominal:      General: Abdomen is flat.      Palpations: Abdomen is soft.   Musculoskeletal:         General: Normal range of motion.   Skin:     General: Skin is warm and dry.   Neurological:      General: No focal deficit present.      Mental Status: He is alert and oriented to person, place, and time.   Psychiatric:         Mood and Affect: Mood normal.         Behavior: Behavior normal.         Thought Content: Thought content normal.         Judgment: Judgment normal.         Labs:   Brief Urine Lab Results  (Last result in the past 365 days)      Color   Clarity   Blood   Leuk Est   Nitrite   Protein   CREAT   Urine HCG        11/01/21 1501 Yellow   Clear   Negative   Negative   Negative   " Negative                 Urine Culture    Urine Culture 6/9/21   Urine Culture No growth                Lab Results   Component Value Date    GLUCOSE 109 (H) 10/27/2021    CALCIUM 9.2 10/27/2021     10/27/2021    K 4.2 10/27/2021    CO2 24.0 10/27/2021     10/27/2021    BUN 13 10/27/2021    CREATININE 0.75 (L) 10/27/2021    EGFRIFNONA 123 10/27/2021    BCR 17.3 10/27/2021    ANIONGAP 12.0 10/27/2021       Lab Results   Component Value Date    WBC 15.08 (H) 10/27/2021    HGB 14.4 10/27/2021    HCT 43.8 10/27/2021    MCV 83.4 10/27/2021     10/27/2021     I personally viewed the patient's most recent laboratory values including a creatinine of 0.75    Images:   CT Abdomen Pelvis Without Contrast    Result Date: 10/27/2021  Obstructive uropathy on the  left due to a  6 mm stone in the  Left distal ureter.    Signer Name: Carrol Awad MD  Signed: 10/27/2021 7:45 PM  Workstation Name: [facility]  [facility]    I personally viewed the patient's recent CT abdomen and pelvis which demonstrates an approximately 6 mm left distal/mid ureteral stone    Measures:   Tobacco:   Faizan Romero  reports that he has never smoked. He has quit using smokeless tobacco.. I have educated him on the risk of diseases from using tobacco products.  Assessment / Plan      Assessment/Plan:   Faizan Romero @ is a 31 y.o. male who presented today with diagnosed kidney stones.  [facility] ED [facility] 10/27/2021 with acute left flank pain.  CT scan demonstrates approximately 6 mm stone.  The patient reports that his pain has since improved and resolved.  He does denies ever straining urine or identifying stone.  We discussed we will have him return with a repeat CT to ensure that he has passed his stone.  He has required multiple stone surgeries previously when he has had stone episodes.    Diagnoses and all orders for this visit:    1. Kidney stone (Primary)  -     POC  Urinalysis Dipstick, Automated  -     CT Abdomen Pelvis Stone Protocol; Future             Patient Education:   Today we discussed the etiology and management of nephrolithiasis.  We discussed work-up including history and physical exam.  We discussed his recent CT imaging.  We will have him return with a repeat CT scan to ensure that he does not continue to have stone although he is not having pain.  If he continues to have stone he will require ureteroscopy and laser lithotripsy.  We will further evaluate after imaging which is pending.  Patient is understanding agreeable plan of care    Follow Up:   Return in about 4 days (around 11/5/2021).    I spent 45 minutes providing clinical care for this patient; including review of patient's chart and provider documentation, face to face time spent with patient in examination room (obtaining history, performing physical exam, discussing diagnosis and management options), placing orders, and completing patient documentation.     Dwight Cartagena MD  [facility] [place]

## 2021-11-04 ENCOUNTER — HOSPITAL ENCOUNTER (OUTPATIENT)
Dept: CT IMAGING | Facility: HOSPITAL | Age: 31
Discharge: HOME | End: 2021-11-04
Admitting: UROLOGY

## 2021-11-04 DIAGNOSIS — N20.0 KIDNEY STONE: ICD-10-CM

## 2021-11-04 PROCEDURE — 74176 CT ABD & PELVIS W/O CONTRAST: CPT

## 2021-11-05 ENCOUNTER — OFFICE VISIT (OUTPATIENT)
Dept: UROLOGY | Facility: CLINIC | Age: 31
End: 2021-11-05

## 2021-11-05 VITALS
SYSTOLIC BLOOD PRESSURE: 136 MMHG | DIASTOLIC BLOOD PRESSURE: 84 MMHG | HEART RATE: 77 BPM | WEIGHT: 315 LBS | OXYGEN SATURATION: 98 % | HEIGHT: 72 IN | BODY MASS INDEX: 42.66 KG/M2

## 2021-11-05 DIAGNOSIS — R10.9 LEFT FLANK PAIN: Primary | ICD-10-CM

## 2021-11-05 PROCEDURE — 99214 OFFICE O/P EST MOD 30 MIN: CPT | Performed by: UROLOGY

## 2022-08-20 NOTE — PROGRESS NOTES
Follow Up Office Visit      Patient Name: Faizan Romero  : 1990   MRN: [id number]     Chief Complaint:    Chief Complaint   Patient presents with   • Nephrolithiasis       Referring Provider: No ref. provider found    History of Present Illness: Faizan Romero is a 31 y.o. male who presents today for follow up after episode of acute left ankle pain which resulted in him presenting to the ED on 10/27/2021.  He was recently seen in clinic with reported improvement in pain but had not captured stone.  He returns today with CT imaging for further evaluation.  Denies current flank pain or additional symptoms.    Subjective      Review of System: Review of Systems   Constitutional: Negative for chills, fatigue, fever and unexpected weight change.   HENT: Negative for sore throat.    Eyes: Negative for visual disturbance.   Respiratory: Negative for cough, chest tightness and shortness of breath.    Cardiovascular: Negative for chest pain and leg swelling.   Gastrointestinal: Negative for blood in stool, constipation, diarrhea, nausea, rectal pain and vomiting.   Genitourinary: Negative for decreased urine volume, difficulty urinating, dysuria, enuresis, flank pain, frequency, genital sores, hematuria and urgency.   Musculoskeletal: Positive for back pain. Negative for joint swelling.   Skin: Negative for rash and wound.   Neurological: Negative for seizures, speech difficulty, weakness and headaches.   Psychiatric/Behavioral: Negative for confusion, sleep disturbance and suicidal ideas. The patient is not nervous/anxious.       I have reviewed the ROS documented by my clinical staff, updated as appropriate and I agree. Dwight Cartagena MD    I have reviewed and the following portions of the patient's history were updated as appropriate: past family history, past medical history, past social history, past surgical history and problem list.    Medications:     Current Outpatient Medications:   •   Per ARNOLD RN, pt  has been accepted by Dr. Aleshia Guzmán NP to [address]     Disposition called to Annalee in admitting, room [address], [place]  08/20/22 0401 "naproxen (NAPROSYN) 250 MG tablet, Take 1 tablet by mouth 2 (Two) Times a Day With Meals., Disp: 24 tablet, Rfl: 0  •  ondansetron ODT (ZOFRAN-ODT) 4 MG disintegrating tablet, Place 1 tablet on the tongue Every 8 (Eight) Hours As Needed for Nausea., Disp: 15 tablet, Rfl: 0  •  oxyCODONE-acetaminophen (PERCOCET) 7.5-325 MG per tablet, Take 1 tablet by mouth Every 6 (Six) Hours As Needed., Disp: , Rfl:   •  tamsulosin (FLOMAX) 0.4 MG capsule 24 hr capsule, Take 1 capsule by mouth Every Night. Until pain is gone or the kidney stone passes, Disp: 15 capsule, Rfl: 0    Allergies:   Allergies   Allergen Reactions   • Shellfish-Derived Products Anaphylaxis         Objective     Physical Exam:   Vital Signs:   Vitals:    11/05/21 1045   BP: 136/84   Pulse: 77   SpO2: 98%   Weight: (!) 145 kg (320 lb)   Height: 182.9 cm (72.01\")   PainSc: 0-No pain     Body mass index is 43.39 kg/m².     Physical Exam  Vitals and nursing note reviewed.   Constitutional:       Appearance: Normal appearance. He is normal weight.   Cardiovascular:      Comments: Well-perfused  Pulmonary:      Effort: Pulmonary effort is normal.   Abdominal:      Palpations: Abdomen is soft.   Musculoskeletal:         General: Normal range of motion.   Neurological:      Mental Status: He is alert and oriented to person, place, and time.   Psychiatric:         Mood and Affect: Mood normal.         Behavior: Behavior normal.         Thought Content: Thought content normal.         Labs:   Brief Urine Lab Results  (Last result in the past 365 days)      Color   Clarity   Blood   Leuk Est   Nitrite   Protein   CREAT   Urine HCG        11/01/21 1501 Yellow   Clear   Negative   Negative   Negative   Negative                 Urine Culture    Urine Culture 6/9/21   Urine Culture No growth              Lab Results   Component Value Date    GLUCOSE 109 (H) 10/27/2021    CALCIUM 9.2 10/27/2021     10/27/2021    K 4.2 10/27/2021    CO2 24.0 10/27/2021     " 10/27/2021    BUN 13 10/27/2021    CREATININE 0.75 (L) 10/27/2021    EGFRIFNONA 123 10/27/2021    BCR 17.3 10/27/2021    ANIONGAP 12.0 10/27/2021       Lab Results   Component Value Date    WBC 15.08 (H) 10/27/2021    HGB 14.4 10/27/2021    HCT 43.8 10/27/2021    MCV 83.4 10/27/2021     10/27/2021       Images:   CT Abdomen Pelvis Without Contrast    Result Date: 10/27/2021  Obstructive uropathy on the  left due to a  6 mm stone in the  Left distal ureter.    Signer Name: Carrol Awad MD  Signed: 10/27/2021 7:45 PM  Workstation Name: [id number]  [facility]    CT Abdomen Pelvis Stone Protocol    Result Date: 11/4/2021  No evidence of renal or ureteral calculi, hydronephrosis or nephropathy. The previously noted obstructing left renal stone is no longer seen.   This report was finalized on 11/4/2021 9:22 AM by Drew Mast.      I personally viewed the patient's CT abdomen pelvis from 11/4/2021.  There is no evidence of nephrolithiasis or ureterolithiasis.  The previously noted left ureteral stone is no longer visualized.      Assessment / Plan      Assessment/Plan:   31 y.o. male is seen today for follow up of after acute left flank pain associated with left ureterolithiasis.  Last presentation the patient reported improvement in pain after being seen in the ED but did not identify stone.  He returns today after CT imaging which reports no evidence of stone confirming passage of stone.  He continues to deny flank pain or lower urinary tract symptoms.  Today we discussed his CT results which demonstrate passage of stone.  We discussed that he is currently asymptomatic.  We did discuss basic strategies to prevent stone events in the future including increasing fluid hydration, decreasing salt intake in the diet, and decreasing animal protein in the diet and increasing citrate intake.  The patient would is understanding and he will contact us if he has further questions or  concerns or another acute stone episode.      Diagnoses and all orders for this visit:    1. Left flank pain (Primary)         Follow Up:   No follow-ups on file.     I spent 30 minutes providing clinical care for this patient; including review of patient's chart and provider documentation, face to face time spent with patient in examination room (obtaining history, performing physical exam, discussing diagnosis and management options), placing orders, and completing patient documentation.     Dwight Cartagena MD  [facility] [place]

## 2022-09-15 NOTE — DISCHARGE INSTR - DIET
Regular diet   Ndc (150 Mg Prefilled Syringe): 35062-510-45 Ndc (150 Mg Prefilled Syringe): 12975-126-10

## 2024-05-09 ENCOUNTER — APPOINTMENT (OUTPATIENT)
Dept: GENERAL RADIOLOGY | Facility: HOSPITAL | Age: 34
End: 2024-05-09
Payer: MEDICAID

## 2024-05-09 ENCOUNTER — HOSPITAL ENCOUNTER (OUTPATIENT)
Facility: HOSPITAL | Age: 34
Setting detail: OBSERVATION
Discharge: HOME | End: 2024-05-10
Attending: INTERNAL MEDICINE | Admitting: INTERNAL MEDICINE
Payer: MEDICAID

## 2024-05-09 DIAGNOSIS — N20.0 KIDNEY STONE: Primary | ICD-10-CM

## 2024-05-09 LAB
ALBUMIN SERPL-MCNC: 3.9 G/DL (ref 3.5–5.2)
ALBUMIN/GLOB SERPL: 1.4 G/DL
ALP SERPL-CCNC: 116 U/L (ref 39–117)
ALT SERPL W P-5'-P-CCNC: 39 U/L (ref 1–41)
ANION GAP SERPL CALCULATED.3IONS-SCNC: 11 MMOL/L (ref 5–15)
AST SERPL-CCNC: 24 U/L (ref 1–40)
BASOPHILS # BLD AUTO: 0.05 10*3/MM3 (ref 0–0.2)
BASOPHILS NFR BLD AUTO: 0.5 % (ref 0–1.5)
BILIRUB SERPL-MCNC: 0.9 MG/DL (ref 0–1.2)
BUN SERPL-MCNC: 10 MG/DL (ref 6–20)
BUN/CREAT SERPL: 14.5 (ref 7–25)
CALCIUM SPEC-SCNC: 8.8 MG/DL (ref 8.6–10.5)
CHLORIDE SERPL-SCNC: 102 MMOL/L (ref 98–107)
CO2 SERPL-SCNC: 25 MMOL/L (ref 22–29)
CREAT SERPL-MCNC: 0.69 MG/DL (ref 0.76–1.27)
DEPRECATED RDW RBC AUTO: 40 FL (ref 37–54)
EGFRCR SERPLBLD CKD-EPI 2021: 126.9 ML/MIN/1.73
EOSINOPHIL # BLD AUTO: 0.13 10*3/MM3 (ref 0–0.4)
EOSINOPHIL NFR BLD AUTO: 1.2 % (ref 0.3–6.2)
ERYTHROCYTE [DISTWIDTH] IN BLOOD BY AUTOMATED COUNT: 12.9 % (ref 12.3–15.4)
GLOBULIN UR ELPH-MCNC: 2.8 GM/DL
GLUCOSE SERPL-MCNC: 96 MG/DL (ref 65–99)
HCT VFR BLD AUTO: 43.8 % (ref 37.5–51)
HGB BLD-MCNC: 14.1 G/DL (ref 13–17.7)
IMM GRANULOCYTES # BLD AUTO: 0.07 10*3/MM3 (ref 0–0.05)
IMM GRANULOCYTES NFR BLD AUTO: 0.7 % (ref 0–0.5)
LYMPHOCYTES # BLD AUTO: 2.1 10*3/MM3 (ref 0.7–3.1)
LYMPHOCYTES NFR BLD AUTO: 19.7 % (ref 19.6–45.3)
MCH RBC QN AUTO: 27.6 PG (ref 26.6–33)
MCHC RBC AUTO-ENTMCNC: 32.2 G/DL (ref 31.5–35.7)
MCV RBC AUTO: 85.7 FL (ref 79–97)
MONOCYTES # BLD AUTO: 0.69 10*3/MM3 (ref 0.1–0.9)
MONOCYTES NFR BLD AUTO: 6.5 % (ref 5–12)
NEUTROPHILS NFR BLD AUTO: 7.62 10*3/MM3 (ref 1.7–7)
NEUTROPHILS NFR BLD AUTO: 71.4 % (ref 42.7–76)
NRBC BLD AUTO-RTO: 0 /100 WBC (ref 0–0.2)
PLATELET # BLD AUTO: 211 10*3/MM3 (ref 140–450)
PMV BLD AUTO: 10.5 FL (ref 6–12)
POTASSIUM SERPL-SCNC: 4.3 MMOL/L (ref 3.5–5.2)
PROCALCITONIN SERPL-MCNC: 0.03 NG/ML (ref 0–0.25)
PROT SERPL-MCNC: 6.7 G/DL (ref 6–8.5)
RBC # BLD AUTO: 5.11 10*6/MM3 (ref 4.14–5.8)
SODIUM SERPL-SCNC: 138 MMOL/L (ref 136–145)
WBC NRBC COR # BLD AUTO: 10.66 10*3/MM3 (ref 3.4–10.8)

## 2024-05-09 PROCEDURE — 99223 1ST HOSP IP/OBS HIGH 75: CPT | Performed by: INTERNAL MEDICINE

## 2024-05-09 PROCEDURE — 80053 COMPREHEN METABOLIC PANEL: CPT | Performed by: INTERNAL MEDICINE

## 2024-05-09 PROCEDURE — G0378 HOSPITAL OBSERVATION PER HR: HCPCS

## 2024-05-09 PROCEDURE — 85025 COMPLETE CBC W/AUTO DIFF WBC: CPT | Performed by: INTERNAL MEDICINE

## 2024-05-09 PROCEDURE — 87040 BLOOD CULTURE FOR BACTERIA: CPT | Performed by: INTERNAL MEDICINE

## 2024-05-09 PROCEDURE — 96374 THER/PROPH/DIAG INJ IV PUSH: CPT

## 2024-05-09 PROCEDURE — 25010000002 HYDROMORPHONE PER 4 MG: Performed by: INTERNAL MEDICINE

## 2024-05-09 PROCEDURE — 71045 X-RAY EXAM CHEST 1 VIEW: CPT

## 2024-05-09 PROCEDURE — 25810000003 LACTATED RINGERS PER 1000 ML: Performed by: INTERNAL MEDICINE

## 2024-05-09 PROCEDURE — G0379 DIRECT REFER HOSPITAL OBSERV: HCPCS

## 2024-05-09 PROCEDURE — 84145 PROCALCITONIN (PCT): CPT | Performed by: INTERNAL MEDICINE

## 2024-05-09 RX ORDER — NALOXONE HCL 0.4 MG/ML
0.4 VIAL (ML) INJECTION
Status: DISCONTINUED | OUTPATIENT
Start: 2024-05-09 | End: 2024-05-10 | Stop reason: HOSPADM

## 2024-05-09 RX ORDER — BISACODYL 10 MG
10 SUPPOSITORY, RECTAL RECTAL DAILY PRN
Status: DISCONTINUED | OUTPATIENT
Start: 2024-05-09 | End: 2024-05-10 | Stop reason: HOSPADM

## 2024-05-09 RX ORDER — ACETAMINOPHEN 650 MG/1
650 SUPPOSITORY RECTAL EVERY 4 HOURS PRN
Status: DISCONTINUED | OUTPATIENT
Start: 2024-05-09 | End: 2024-05-10 | Stop reason: HOSPADM

## 2024-05-09 RX ORDER — HYDROMORPHONE HYDROCHLORIDE 1 MG/ML
0.5 INJECTION, SOLUTION INTRAMUSCULAR; INTRAVENOUS; SUBCUTANEOUS
Status: DISCONTINUED | OUTPATIENT
Start: 2024-05-09 | End: 2024-05-10 | Stop reason: HOSPADM

## 2024-05-09 RX ORDER — SODIUM CHLORIDE 0.9 % (FLUSH) 0.9 %
10 SYRINGE (ML) INJECTION EVERY 12 HOURS SCHEDULED
Status: DISCONTINUED | OUTPATIENT
Start: 2024-05-09 | End: 2024-05-10 | Stop reason: HOSPADM

## 2024-05-09 RX ORDER — ACETAMINOPHEN 325 MG/1
650 TABLET ORAL EVERY 4 HOURS PRN
Status: DISCONTINUED | OUTPATIENT
Start: 2024-05-09 | End: 2024-05-10 | Stop reason: HOSPADM

## 2024-05-09 RX ORDER — SODIUM CHLORIDE, SODIUM LACTATE, POTASSIUM CHLORIDE, CALCIUM CHLORIDE 600; 310; 30; 20 MG/100ML; MG/100ML; MG/100ML; MG/100ML
100 INJECTION, SOLUTION INTRAVENOUS CONTINUOUS
Status: DISCONTINUED | OUTPATIENT
Start: 2024-05-09 | End: 2024-05-10 | Stop reason: HOSPADM

## 2024-05-09 RX ORDER — AMOXICILLIN 250 MG
2 CAPSULE ORAL 2 TIMES DAILY PRN
Status: DISCONTINUED | OUTPATIENT
Start: 2024-05-09 | End: 2024-05-10 | Stop reason: HOSPADM

## 2024-05-09 RX ORDER — SODIUM CHLORIDE 0.9 % (FLUSH) 0.9 %
10 SYRINGE (ML) INJECTION AS NEEDED
Status: DISCONTINUED | OUTPATIENT
Start: 2024-05-09 | End: 2024-05-10 | Stop reason: HOSPADM

## 2024-05-09 RX ORDER — ACETAMINOPHEN 160 MG/5ML
650 SOLUTION ORAL EVERY 4 HOURS PRN
Status: DISCONTINUED | OUTPATIENT
Start: 2024-05-09 | End: 2024-05-10 | Stop reason: HOSPADM

## 2024-05-09 RX ORDER — POLYETHYLENE GLYCOL 3350 17 G/17G
17 POWDER, FOR SOLUTION ORAL DAILY PRN
Status: DISCONTINUED | OUTPATIENT
Start: 2024-05-09 | End: 2024-05-10 | Stop reason: HOSPADM

## 2024-05-09 RX ORDER — ONDANSETRON 2 MG/ML
4 INJECTION INTRAMUSCULAR; INTRAVENOUS EVERY 6 HOURS PRN
Status: DISCONTINUED | OUTPATIENT
Start: 2024-05-09 | End: 2024-05-10 | Stop reason: HOSPADM

## 2024-05-09 RX ORDER — HYDROCODONE BITARTRATE AND ACETAMINOPHEN 5; 325 MG/1; MG/1
1 TABLET ORAL EVERY 4 HOURS PRN
Status: DISCONTINUED | OUTPATIENT
Start: 2024-05-09 | End: 2024-05-10 | Stop reason: HOSPADM

## 2024-05-09 RX ORDER — SODIUM CHLORIDE 9 MG/ML
40 INJECTION, SOLUTION INTRAVENOUS AS NEEDED
Status: DISCONTINUED | OUTPATIENT
Start: 2024-05-09 | End: 2024-05-10 | Stop reason: HOSPADM

## 2024-05-09 RX ORDER — TAMSULOSIN HYDROCHLORIDE 0.4 MG/1
0.4 CAPSULE ORAL DAILY
Status: DISCONTINUED | OUTPATIENT
Start: 2024-05-09 | End: 2024-05-10 | Stop reason: HOSPADM

## 2024-05-09 RX ORDER — BISACODYL 5 MG/1
5 TABLET, DELAYED RELEASE ORAL DAILY PRN
Status: DISCONTINUED | OUTPATIENT
Start: 2024-05-09 | End: 2024-05-10 | Stop reason: HOSPADM

## 2024-05-09 RX ADMIN — TAMSULOSIN HYDROCHLORIDE 0.4 MG: 0.4 CAPSULE ORAL at 18:51

## 2024-05-09 RX ADMIN — SODIUM CHLORIDE, POTASSIUM CHLORIDE, SODIUM LACTATE AND CALCIUM CHLORIDE 100 ML/HR: 600; 310; 30; 20 INJECTION, SOLUTION INTRAVENOUS at 22:07

## 2024-05-09 RX ADMIN — HYDROCODONE BITARTRATE AND ACETAMINOPHEN 1 TABLET: 5; 325 TABLET ORAL at 18:51

## 2024-05-09 RX ADMIN — HYDROMORPHONE HYDROCHLORIDE 0.5 MG: 1 INJECTION, SOLUTION INTRAMUSCULAR; INTRAVENOUS; SUBCUTANEOUS at 22:06

## 2024-05-09 NOTE — PLAN OF CARE
Goal Outcome Evaluation:  Plan of Care Reviewed With: patient        Progress: improving  Outcome Evaluation: patient alert and oriented transfered from [place] with a kidney stone and uti, not in acute distress, multiple attempts for IV access, requires US guided access, family at bedside patient to be N{O after midnight, strain all urine

## 2024-05-09 NOTE — H&P
[facility] Medicine Services  HISTORY AND PHYSICAL    Patient Name: Faizan Romero  : 1990  MRN: [id number]  Primary Care Physician: Burton, No Known  Date of admission: 2024      Subjective   Subjective     Chief Complaint:  Left flank pain    HPI:  Faizan Romero is a 33 y.o. male with history of nephrolithiasis presents with left flank pain, nausea and emesis.  Symptom onset this morning, similar to three prior kidney stones.  Slight blood in urine.  No fever or chills, but felt sweaty.  Presented to OSH where imaging showed a 5 mm stone UA reportedly showed a UTI (imaging and labs not included in transfer packet).  Rocephin given.  Patient transferred for Urologic evaluation.    ROS  Gen: no fever or chills  : left flank pain, intermittent, improved with pain meds  Pulm: chronic cough x 5 months      Personal History     Past Medical History:   Diagnosis Date    Kidney stone     Renal disorder            Past Surgical History:   Procedure Laterality Date    CYSTOSCOPY URETEROSCOPY Left 6/10/2021    Procedure: CYSTOSCOPY LEFT URETEROSCOPY, STENT PLCEMENT;  Surgeon: Tyrone Garza Jr., MD;  Location:  [facility];  Service: Urology;  Laterality: Left;    CYSTOSCOPY URETEROSCOPY STONE MANIPULATION/EXTRACTION Left 3/29/2017    Procedure: LEFT URETEROSCOPY, LEFT URETERAL DILATION, STENT PLACEMENT;  Surgeon: Niles Lomas MD;  Location:  [facility];  Service:     CYSTOSCOPY URETEROSCOPY STONE MANIPULATION/EXTRACTION Left 2017    Procedure: LEFT CYSTOSCOPY URETEROSCOPY STONE MANIPULATION/EXTRACTION, LASER LITHOTRIPSY LEFT URETERAL DILATION, REMOVAL OF STENT, PLACEMENT JJ STENT 6X26;  Surgeon: Niles Lomas MD;  Location:  [facility];  Service:     THROAT SURGERY      benign cyst removed at age 5.       Family History: family history is not on file.     Social History:  reports that he has never smoked. He has quit using smokeless tobacco. He reports  current alcohol use of about 3.0 standard drinks of alcohol per week. He reports that he does not use drugs.  Social History     Social History Narrative    Not on file       Medications:  Available home medication information reviewed.  naproxen, ondansetron ODT, oxyCODONE-acetaminophen, and tamsulosin    Allergies   Allergen Reactions    Shellfish-Derived Products Anaphylaxis       Objective   Objective     Vital Signs:   Temp:  [98.2 °F (36.8 °C)] 98.2 °F (36.8 °C)  Heart Rate:  [78] 78  Resp:  [18] 18  BP: (143)/(97) 143/97       Physical Exam   NAD, in bed  MM moist  RRR  CTAB  Abd soft, NT  No CVA tenderness  Obese  Alert, speech clear  Normal affect    Result Review:  I have personally reviewed the results from the time of this admission to 5/9/2024 16:47 EDT and agree with these findings:  []  Laboratory list / accordion  []  Microbiology  []  Radiology  []  EKG/Telemetry   []  Cardiology/Vascular   []  Pathology  []  Old records  []  Other:  Most notable findings include: No labs or imaging report in transfer packet      LAB RESULTS:                                  Microbiology Results (last 10 days)       ** No results found for the last 240 hours. **            No radiology results from the last 24 hrs        Assessment & Plan   Assessment & Plan       Nephrolithiasis    Nephrolithiasis  UTI  - reported 5mm left stone and urinalysis positive for UTI  - patient given Rocephin at OSH, will continue empiric antibiotics  - check CBC and CMP  - IV fluids  - flomax  - pain and nausea control  - obtain OSH records  - Urology consultation    Chronic cough  - x 5 months  - check CXR  - patient needs PCP with further workup as indicated.         DVT prophylaxis:  Mechanical DVT prophylaxis orders are present.          CODE STATUS:  Full code  There are no questions and answers to display.       Expected Discharge   Expected Discharge Date: 5/13/2024; Expected Discharge Time:      Pablo Menard MD  05/09/24

## 2024-05-10 VITALS
OXYGEN SATURATION: 97 % | DIASTOLIC BLOOD PRESSURE: 71 MMHG | BODY MASS INDEX: 42.66 KG/M2 | SYSTOLIC BLOOD PRESSURE: 129 MMHG | HEIGHT: 72 IN | RESPIRATION RATE: 18 BRPM | HEART RATE: 75 BPM | TEMPERATURE: 97.8 F | WEIGHT: 315 LBS

## 2024-05-10 LAB
ANION GAP SERPL CALCULATED.3IONS-SCNC: 7 MMOL/L (ref 5–15)
BUN SERPL-MCNC: 14 MG/DL (ref 6–20)
BUN/CREAT SERPL: 18.9 (ref 7–25)
CALCIUM SPEC-SCNC: 8.6 MG/DL (ref 8.6–10.5)
CHLORIDE SERPL-SCNC: 106 MMOL/L (ref 98–107)
CO2 SERPL-SCNC: 27 MMOL/L (ref 22–29)
CREAT SERPL-MCNC: 0.74 MG/DL (ref 0.76–1.27)
DEPRECATED RDW RBC AUTO: 40 FL (ref 37–54)
EGFRCR SERPLBLD CKD-EPI 2021: 124.2 ML/MIN/1.73
ERYTHROCYTE [DISTWIDTH] IN BLOOD BY AUTOMATED COUNT: 13.1 % (ref 12.3–15.4)
GLUCOSE SERPL-MCNC: 103 MG/DL (ref 65–99)
HCT VFR BLD AUTO: 40.6 % (ref 37.5–51)
HGB BLD-MCNC: 13.1 G/DL (ref 13–17.7)
MCH RBC QN AUTO: 27.2 PG (ref 26.6–33)
MCHC RBC AUTO-ENTMCNC: 32.3 G/DL (ref 31.5–35.7)
MCV RBC AUTO: 84.2 FL (ref 79–97)
PLATELET # BLD AUTO: 217 10*3/MM3 (ref 140–450)
PMV BLD AUTO: 10.4 FL (ref 6–12)
POTASSIUM SERPL-SCNC: 4.2 MMOL/L (ref 3.5–5.2)
RBC # BLD AUTO: 4.82 10*6/MM3 (ref 4.14–5.8)
SODIUM SERPL-SCNC: 140 MMOL/L (ref 136–145)
WBC NRBC COR # BLD AUTO: 9.53 10*3/MM3 (ref 3.4–10.8)

## 2024-05-10 PROCEDURE — G0378 HOSPITAL OBSERVATION PER HR: HCPCS

## 2024-05-10 PROCEDURE — 99239 HOSP IP/OBS DSCHRG MGMT >30: CPT | Performed by: INTERNAL MEDICINE

## 2024-05-10 PROCEDURE — 80048 BASIC METABOLIC PNL TOTAL CA: CPT | Performed by: INTERNAL MEDICINE

## 2024-05-10 PROCEDURE — 25010000002 HYDROMORPHONE PER 4 MG: Performed by: INTERNAL MEDICINE

## 2024-05-10 PROCEDURE — 96376 TX/PRO/DX INJ SAME DRUG ADON: CPT

## 2024-05-10 PROCEDURE — 85027 COMPLETE CBC AUTOMATED: CPT | Performed by: INTERNAL MEDICINE

## 2024-05-10 RX ORDER — CEFUROXIME AXETIL 500 MG/1
500 TABLET ORAL 2 TIMES DAILY
Qty: 10 TABLET | Refills: 0 | Status: SHIPPED | OUTPATIENT
Start: 2024-05-10 | End: 2024-05-15

## 2024-05-10 RX ORDER — TAMSULOSIN HYDROCHLORIDE 0.4 MG/1
0.4 CAPSULE ORAL DAILY
Qty: 30 CAPSULE | Refills: 0 | Status: SHIPPED | OUTPATIENT
Start: 2024-05-10

## 2024-05-10 RX ORDER — HYDROCODONE BITARTRATE AND ACETAMINOPHEN 5; 325 MG/1; MG/1
1 TABLET ORAL EVERY 4 HOURS PRN
Qty: 10 TABLET | Refills: 0 | Status: SHIPPED | OUTPATIENT
Start: 2024-05-10

## 2024-05-10 RX ADMIN — TAMSULOSIN HYDROCHLORIDE 0.4 MG: 0.4 CAPSULE ORAL at 09:43

## 2024-05-10 RX ADMIN — HYDROMORPHONE HYDROCHLORIDE 0.5 MG: 1 INJECTION, SOLUTION INTRAMUSCULAR; INTRAVENOUS; SUBCUTANEOUS at 00:19

## 2024-05-10 RX ADMIN — Medication 10 ML: at 09:44

## 2024-05-10 NOTE — CONSULTS
Consult    Patient Name: Faizan Romero  Medical Record Number: [id number]  YOB: 1990    Date of consultation: 5/10/2024    Referring Provider:Nestor Crowley PA-C  Reason for Consultation: Left ureteral stone    Patient Care Team:  Provider, No Known as PCP - General    Chief complaint No chief complaint on file.      Subjective .     History of present illness:    33-year-old gentleman who was transferred to this hospital with left ureteral stone.  There was suspicion of urinary tract infection.  The patient had been evaluated for left renal colic and found to have 5 mm proximal left ureteral stone.  Urinalysis was nitrite positive however on microscopic analysis there was only 0-2 white blood cells with 1+ bacteria.  Patient denies flank pain this morning.  He passed what appeared to be a stone last night although he is not sure.  This was not collected.    Today the patient is without pain.  He has no nausea or vomiting.  He has not had any fevers or chills.  Labs which were obtained after admission look good.  He is interested in trial of passage.    Past Medical History:   Diagnosis Date    Kidney stone     Renal disorder      Past Surgical History:   Procedure Laterality Date    CYSTOSCOPY URETEROSCOPY Left 6/10/2021    Procedure: CYSTOSCOPY LEFT URETEROSCOPY, STENT PLCEMENT;  Surgeon: Tyrone Garza Jr., MD;  Location: [facility];  Service: Urology;  Laterality: Left;    CYSTOSCOPY URETEROSCOPY STONE MANIPULATION/EXTRACTION Left 3/29/2017    Procedure: LEFT URETEROSCOPY, LEFT URETERAL DILATION, STENT PLACEMENT;  Surgeon: Niles Lomas MD;  Location:  [facility];  Service:     CYSTOSCOPY URETEROSCOPY STONE MANIPULATION/EXTRACTION Left 4/12/2017    Procedure: LEFT CYSTOSCOPY URETEROSCOPY STONE MANIPULATION/EXTRACTION, LASER LITHOTRIPSY LEFT URETERAL DILATION, REMOVAL OF STENT, PLACEMENT JJ STENT 6X26;  Surgeon: Niles Lomas MD;  Location: [facility];  Service:     THROAT  "SURGERY      benign cyst removed at age 5.     History reviewed. No pertinent family history.  Social History     Tobacco Use    Smoking status: Never    Smokeless tobacco: Former   Vaping Use    Vaping status: Never Used   Substance Use Topics    Alcohol use: Yes     Alcohol/week: 3.0 standard drinks of alcohol     Types: 3 Cans of beer per week     Comment: pt drinks once a week    Drug use: No     No medications prior to admission.     Allergies:  Shellfish-derived products    Review of Systems  Pertinent items are noted in HPI    Objective     Vital Signs   /80 (BP Location: Right arm, Patient Position: Lying)   Pulse 82   Temp 97.9 °F (36.6 °C) (Oral)   Resp 19   Ht 182.9 cm (72\")   Wt (!) 145 kg (319 lb 10.7 oz)   SpO2 96%   BMI 43.35 kg/m²     Physical Exam:  General Appearance: No apparent distress  Back: No kyphosis present, no scoliosis present,no tenderness to percussion or Palpation  Lungs: Respirations regular, even and  unlabored  Heart: Regular rhythm and normal rate  Chest Wall: No abnormalities observed  Abdomen: Obese, benign, no costovertebral angle tenderness  Genital: Deferred  Rectal: Deferred  Extremities: Moves all extremities well, no edema no cyanosis, no redness  Pulses: Pulses palpable  Skin: No bleeding, bruising or rash  Lymph nodes: No palpable adenopathy  Neurologic: Neurologically grossly intact    Results Review:  Lab Results (last 24 hours)       Procedure Component Value Units Date/Time    CBC (No Diff) [[id number]]  (Normal) Collected: 05/10/24 0628    Specimen: Blood Updated: 05/10/24 0700     WBC 9.53 10*3/mm3      RBC 4.82 10*6/mm3      Hemoglobin 13.1 g/dL      Hematocrit 40.6 %      MCV 84.2 fL      MCH 27.2 pg      MCHC 32.3 g/dL      RDW 13.1 %      RDW-SD 40.0 fl      MPV 10.4 fL      Platelets 217 10*3/mm3     Basic Metabolic Panel [[id number]] Collected: 05/10/24 0628    Specimen: Blood Updated: 05/10/24 0647    Procalcitonin [[id number]]  (Normal) Collected: " "05/09/24 1644    Specimen: Blood Updated: 05/09/24 1722     Procalcitonin 0.03 ng/mL     Narrative:      As a Marker for Sepsis (Non-Neonates):    1. <0.5 ng/mL represents a low risk of severe sepsis and/or septic shock.  2. >2 ng/mL represents a high risk of severe sepsis and/or septic shock.    As a Marker for Lower Respiratory Tract Infections that require antibiotic therapy:    PCT on Admission    Antibiotic Therapy       6-12 Hrs later    >0.5                Strongly Recommended  >0.25 - <0.5        Recommended   0.1 - 0.25          Discouraged              Remeasure/reassess PCT  <0.1                Strongly Discouraged     Remeasure/reassess PCT    As 28 day mortality risk marker: \"Change in Procalcitonin Result\" (>80% or <=80%) if Day 0 (or Day 1) and Day 4 values are available. Refer to http://www.[employer]-pct-calculator.com    Change in PCT <=80%  A decrease of PCT levels below or equal to 80% defines a positive change in PCT test result representing a higher risk for 28-day all-cause mortality of patients diagnosed with severe sepsis for septic shock.    Change in PCT >80%  A decrease of PCT levels of more than 80% defines a negative change in PCT result representing a lower risk for 28-day all-cause mortality of patients diagnosed with severe sepsis or septic shock.       Comprehensive Metabolic Panel [[id number]]  (Abnormal) Collected: 05/09/24 1644    Specimen: Blood Updated: 05/09/24 1717     Glucose 96 mg/dL      BUN 10 mg/dL      Creatinine 0.69 mg/dL      Sodium 138 mmol/L      Potassium 4.3 mmol/L      Chloride 102 mmol/L      CO2 25.0 mmol/L      Calcium 8.8 mg/dL      Total Protein 6.7 g/dL      Albumin 3.9 g/dL      ALT (SGPT) 39 U/L      AST (SGOT) 24 U/L      Alkaline Phosphatase 116 U/L      Total Bilirubin 0.9 mg/dL      Globulin 2.8 gm/dL      Comment: Calculated Result        A/G Ratio 1.4 g/dL      BUN/Creatinine Ratio 14.5     Anion Gap 11.0 mmol/L      eGFR 126.9 mL/min/1.73     " Narrative:      GFR Normal >60  Chronic Kidney Disease <60  Kidney Failure <15      CBC Auto Differential [[id number]]  (Abnormal) Collected: 05/09/24 1644    Specimen: Blood Updated: 05/09/24 1658     WBC 10.66 10*3/mm3      RBC 5.11 10*6/mm3      Hemoglobin 14.1 g/dL      Hematocrit 43.8 %      MCV 85.7 fL      MCH 27.6 pg      MCHC 32.2 g/dL      RDW 12.9 %      RDW-SD 40.0 fl      MPV 10.5 fL      Platelets 211 10*3/mm3      Neutrophil % 71.4 %      Lymphocyte % 19.7 %      Monocyte % 6.5 %      Eosinophil % 1.2 %      Basophil % 0.5 %      Immature Grans % 0.7 %      Neutrophils, Absolute 7.62 10*3/mm3      Lymphocytes, Absolute 2.10 10*3/mm3      Monocytes, Absolute 0.69 10*3/mm3      Eosinophils, Absolute 0.13 10*3/mm3      Basophils, Absolute 0.05 10*3/mm3      Immature Grans, Absolute 0.07 10*3/mm3      nRBC 0.0 /100 WBC     Blood Culture - Blood, Arm, Right [[id number]] Collected: 05/09/24 1644    Specimen: Blood from Arm, Right Updated: 05/09/24 1657    Blood Culture - Blood, Hand, Right [[id number]] Collected: 05/09/24 1644    Specimen: Blood from Hand, Right Updated: 05/09/24 1656          Imaging Results (Last 72 Hours)       Procedure Component Value Units Date/Time    XR Chest 1 View [[id number]] Collected: 05/09/24 1739     Updated: 05/09/24 1743    Narrative:      XR CHEST 1 VW    Date of Exam: 5/9/2024 5:30 PM EDT    Indication: chronic cough    Comparison: None available.    Findings:  Lines: None    Lungs: Poor respiratory effort accentuates the pulmonary vasculature and cardiomediastinal silhouette. No definite consolidation.  Pleura: No pleural effusion or pneumothorax.    Cardiomediastinum: The cardiomediastinal silhouette is normal    Soft Tissues: Unremarkable.    Bones: No acute osseous abnormality.      Impression:      Impression:  No acute abnormality.      Electronically Signed: Chip Carter DO    5/9/2024 5:40 PM EDT    Workstation ID: [id number]             I reviewed the patient's  new clinical results.      Assessment and Recommendations  Left ureteral stone    At this point the patient is asymptomatic and nontoxic-appearing.  I am not sure that he actually has urinary tract infection and certainly no signs of pyelonephritis.  To that end I agree that the patient could have trial of passage and would agree with discharge today.  I would send him home on antibiotics, alpha-blocker therapy, and analgesics.  We can follow him up in our office on Monday.  He is instructed to come back to the emergency room or call our office number to speak with the urologist on-call if he has problems over the weekend.      Nephrolithiasis      I discussed the patients findings and my recommendations with patient and family    Tyrone Garza Jr., MD  05/10/24  07:05 EDT

## 2024-05-10 NOTE — CASE MANAGEMENT/SOCIAL WORK
Continued Stay Note  [facility]     Patient Name: Faizan Romero  MRN: [id number]  Today's Date: 5/10/2024    Admit Date: 5/9/2024    Plan: home   Discharge Plan       Row Name 05/10/24 0914       Plan    Plan home    Patient/Family in Agreement with Plan yes    Plan Comments I met with this patient bedside regarding his discharge home today. His wife can transport. CM gave him the number to call for a new PCP appointment per his request. He denies having any further discharge planning needs.    Final Discharge Disposition Code 01 - home or self-care      Row Name 05/10/24 0834       Plan    Plan home    Patient/Family in Agreement with Plan yes    Plan Comments I met with this patient bedside. He lives with his wife in [facility]. He is independent with activities of daily living and mobility. He anticipates returning home after this hospitalization, and his wife can transport. He does not have a PCP. He asked for the number to make a new appointment, but does not want CM to make the appointment for him. Case management will follow.    Final Discharge Disposition Code 01 - home or self-care                   Discharge Codes    No documentation.                 Expected Discharge Date and Time       Expected Discharge Date Expected Discharge Time    May 10, 2024               Kiana Saeed RN

## 2024-05-10 NOTE — DISCHARGE SUMMARY
[facility] Medicine Services  DISCHARGE SUMMARY    Patient Name: Faizan Romero  : 1990  MRN: [id number]    Date of Admission: 2024  3:13 PM  Date of Discharge:  05/10/24  Primary Care Physician: Provider, No Known    Consults       Date and Time Order Name Status Description    2024  4:06 PM Inpatient Urology Consult Completed             Hospital Course     Presenting Problem: flank pain     Active Hospital Problems    Diagnosis  POA    **Nephrolithiasis [N20.0]  Yes      Resolved Hospital Problems   No resolved problems to display.          Hospital Course:  Faizan Romero is a 33 y.o. male with history of nephrolithiasis presented with left flank pain, nausea and emesis.  Imaging with reported 5 mm left stone. Improvement in pain overnight and felt like he passed the stone. Eval by Urology. Recommend DC with antibiotics -- discharged with ceftin, flomax and pain control. Follow up in the office on .     Chronic cough. CXR with no acute findings. No tobacco history per patient. Strongly encouraged follow up/establish with PCP. He was given a number to contact.     Discharge Follow Up Recommendations for outpatient labs/diagnostics:  PCP -- next available  Urology 24     Day of Discharge     HPI:   No acute events. Pain resolved. Reviewed CXR. Reviewed medications and follow ups. Answered all questions to the best of my ability. Agreeable to DC today.       Vital Signs:   Temp:  [97.6 °F (36.4 °C)-98.2 °F (36.8 °C)] 97.8 °F (36.6 °C)  Heart Rate:  [74-94] 75  Resp:  [16-19] 18  BP: (128-143)/(71-97) 129/71      Physical Exam:  Constitutional: No acute distress, awake, alert; obese  HENT: NCAT, mucous membranes moist  Respiratory: Clear to auscultation bilaterally, respiratory effort normal   Cardiovascular: RRR, no murmurs, rubs, or gallops  Gastrointestinal: Positive bowel sounds, soft, nontender, nondistended  Musculoskeletal: No bilateral ankle  edema  Psychiatric: Appropriate affect, cooperative  Neurologic: Oriented x 3, strength symmetric in all extremities, Cranial Nerves grossly intact to confrontation, speech clear  Skin: No rashes      Pertinent  and/or Most Recent Results     LAB RESULTS:      Lab 05/10/24  0628 05/09/24  1644   WBC 9.53 10.66   HEMOGLOBIN 13.1 14.1   HEMATOCRIT 40.6 43.8   PLATELETS 217 211   NEUTROS ABS  --  7.62*   IMMATURE GRANS (ABS)  --  0.07*   LYMPHS ABS  --  2.10   MONOS ABS  --  0.69   EOS ABS  --  0.13   MCV 84.2 85.7   PROCALCITONIN  --  0.03         Lab 05/10/24  0628 05/09/24  1644   SODIUM 140 138   POTASSIUM 4.2 4.3   CHLORIDE 106 102   CO2 27.0 25.0   ANION GAP 7.0 11.0   BUN 14 10   CREATININE 0.74* 0.69*   EGFR 124.2 126.9   GLUCOSE 103* 96   CALCIUM 8.6 8.8         Lab 05/09/24  1644   TOTAL PROTEIN 6.7   ALBUMIN 3.9   GLOBULIN 2.8   ALT (SGPT) 39   AST (SGOT) 24   BILIRUBIN 0.9   ALK PHOS 116                     Brief Urine Lab Results       None          Microbiology Results (last 10 days)       ** No results found for the last 240 hours. **            XR Chest 1 View    Result Date: 5/9/2024  XR CHEST 1 VW Date of Exam: 5/9/2024 5:30 PM EDT Indication: chronic cough Comparison: None available. Findings: Lines: None Lungs: Poor respiratory effort accentuates the pulmonary vasculature and cardiomediastinal silhouette. No definite consolidation. Pleura: No pleural effusion or pneumothorax. Cardiomediastinum: The cardiomediastinal silhouette is normal Soft Tissues: Unremarkable. Bones: No acute osseous abnormality.     Impression: No acute abnormality. Electronically Signed: Chip Carter DO  5/9/2024 5:40 PM EDT  Workstation ID: [id number]                 Plan for Follow-up of Pending Labs/Results:   Pending Labs       Order Current Status    Blood Culture - Blood, Arm, Right In process    Blood Culture - Blood, Hand, Right In process          Discharge Details        Discharge Medications        New  Medications        Instructions Start Date   cefuroxime 500 MG tablet  Commonly known as: CEFTIN   500 mg, Oral, 2 Times Daily      HYDROcodone-acetaminophen 5-325 MG per tablet  Commonly known as: NORCO   1 tablet, Oral, Every 4 Hours PRN      tamsulosin 0.4 MG capsule 24 hr capsule  Commonly known as: FLOMAX   0.4 mg, Oral, Daily               Allergies   Allergen Reactions    Shellfish-Derived Products Anaphylaxis         Discharge Disposition:  Home or Self Care    Diet:  Hospital:  Diet Order   Procedures    Diet: Regular/House; Fluid Consistency: Thin (IDDSI 0)       Diet Instructions       Diet: Regular/House Diet; Thin (IDDSI 0)      Discharge Diet: Regular/House Diet    Fluid Consistency: Thin (IDDSI 0)             Activity:  Activity Instructions       Activity as Tolerated              Restrictions or Other Recommendations:         CODE STATUS:    There are no questions and answers to display.       No future appointments.    Additional Instructions for the Follow-ups that You Need to Schedule       Discharge Follow-up with PCP   As directed       Currently Documented PCP:    Provider, No Known    PCP Phone Number:    None     Follow Up Details: PCP next available        Discharge Follow-up with Specified Provider: Urology Dr. Garza 5/13/24   As directed      To: Urology Dr. Garza 5/13/24                      Ana Evans DO  05/10/24      Time Spent on Discharge:  I spent  33  minutes on this discharge activity which included: face-to-face encounter with the patient, reviewing the data in the system, coordination of the care with the nursing staff as well as consultants, documentation, and entering orders.

## 2024-05-10 NOTE — PLAN OF CARE
Goal Outcome Evaluation:  Plan of Care Reviewed With: patient        Progress: improving  Outcome Evaluation: patient alert and oriented, ambulates independently, verbalized increase in comfort, passed small stone and eager to discharge                               Problem: Adult Inpatient Plan of Care  Goal: Plan of Care Review  Outcome: Ongoing, Progressing  Flowsheets  Taken 5/10/2024 1041 by Jade Lundy, RN  Plan of Care Reviewed With: patient  Outcome Evaluation: patient alert and oriented, ambulates independently, verbalized increase in comfort, passed small stone and eager to discharge  Taken 5/10/2024 0343 by Liliana Hansen, RN  Progress: improving  Goal: Patient-Specific Goal (Individualized)  Outcome: Ongoing, Progressing  Goal: Absence of Hospital-Acquired Illness or Injury  Outcome: Ongoing, Progressing  Goal: Optimal Comfort and Wellbeing  Outcome: Ongoing, Progressing  Goal: Readiness for Transition of Care  Outcome: Ongoing, Progressing

## 2024-05-10 NOTE — PLAN OF CARE
Goal Outcome Evaluation:  Plan of Care Reviewed With: patient   - VSS, RA, A&Ox4  - PRNs given for pain  - Fluids infusing  - NPO since midnight     Progress: improving

## 2024-05-10 NOTE — CASE MANAGEMENT/SOCIAL WORK
Discharge Planning Assessment  [facility]     Patient Name: Faizan Romero  MRN: [id number]  Today's Date: 5/10/2024    Admit Date: 5/9/2024    Plan: home   Discharge Needs Assessment       Row Name 05/10/24 0834       Living Environment    People in Home spouse    Name(s) of People in Home wife, Padmini    Current Living Arrangements home    Primary Care Provided by self       Transition Planning    Patient/Family Anticipates Transition to home with family       Discharge Needs Assessment    Readmission Within the Last 30 Days no previous admission in last 30 days    Equipment Currently Used at Home none    Concerns to be Addressed basic needs;discharge planning                   Discharge Plan       Row Name 05/10/24 0834       Plan    Plan home    Patient/Family in Agreement with Plan yes    Plan Comments I met with this patient bedside. He lives with his wife in [facility] [facility]. He is independent with activities of daily living and mobility. He anticipates returning home after this hospitalization, and his wife can transport. He does not have a PCP. He asked for the number to make a new appointment, but does not want CM to make the appointment for him. Case management will follow.    Final Discharge Disposition Code 01 - home or self-care                  Continued Care and Services - Admitted Since 5/9/2024    No active coordination exists for this encounter.       Expected Discharge Date and Time       Expected Discharge Date Expected Discharge Time    May 13, 2024            Demographic Summary       Row Name 05/10/24 0833       General Information    General Information Comments I confirmed that this patient does not have a PCp and he has [place] Medicaid                   Functional Status       Row Name 05/10/24 0834       Functional Status, IADL    Medications independent    Meal Preparation independent    Housekeeping independent    Laundry independent    Shopping independent                    Psychosocial    No documentation.                  Abuse/Neglect    No documentation.                  Legal    No documentation.                  Substance Abuse    No documentation.                  Patient Forms    No documentation.                     Kiana Saeed RN

## 2024-05-14 LAB
BACTERIA SPEC AEROBE CULT: NORMAL
BACTERIA SPEC AEROBE CULT: NORMAL

## (undated) DEVICE — NITINOL STONE RETRIEVAL DEVICE: Brand: DAKOTA

## (undated) DEVICE — URETERAL DILATATION SYSTEM

## (undated) DEVICE — GLV SURG SENSICARE PI MIC PF SZ7.5 LF STRL

## (undated) DEVICE — PK CYSTO-TUR BASIC 10

## (undated) DEVICE — URETERAL ACCESS SHEATH SET: Brand: NAVIGATOR HD

## (undated) DEVICE — URETERAL DILATING CATHETER: Brand: FORTÉ® AXP ACCESS SHEATH

## (undated) DEVICE — GLV SURG SENSICARE W/ALOE PF LF 7.5 STRL

## (undated) DEVICE — GLW STD FLX STR 8CM .035IN 150CM

## (undated) DEVICE — WATER 50W MAX / AIR 8W MAX: Brand: FLEXIVA TRACTIP

## (undated) DEVICE — DILATOR/SHEATH SET: Brand: 8/10 DILATOR/SHEATH SET

## (undated) DEVICE — BALLOON DILATATION CATHETER: Brand: UROMAX ULTRA

## (undated) DEVICE — NITINOL WIRE WITH HYDROPHILIC TIP: Brand: SENSOR